# Patient Record
Sex: FEMALE | Race: WHITE | NOT HISPANIC OR LATINO | URBAN - METROPOLITAN AREA
[De-identification: names, ages, dates, MRNs, and addresses within clinical notes are randomized per-mention and may not be internally consistent; named-entity substitution may affect disease eponyms.]

---

## 2024-07-12 ENCOUNTER — APPOINTMENT (OUTPATIENT)
Dept: OBSTETRICS AND GYNECOLOGY | Facility: CLINIC | Age: 59
End: 2024-07-12
Payer: MEDICARE

## 2024-07-12 ENCOUNTER — HOSPITAL ENCOUNTER (OUTPATIENT)
Dept: RADIOLOGY | Facility: CLINIC | Age: 59
Discharge: HOME | End: 2024-07-12
Payer: MEDICARE

## 2024-07-12 ENCOUNTER — TRANSCRIBE ORDERS (OUTPATIENT)
Dept: OBSTETRICS AND GYNECOLOGY | Facility: CLINIC | Age: 59
End: 2024-07-12

## 2024-07-12 VITALS
HEIGHT: 65 IN | SYSTOLIC BLOOD PRESSURE: 124 MMHG | DIASTOLIC BLOOD PRESSURE: 80 MMHG | BODY MASS INDEX: 20.83 KG/M2 | WEIGHT: 125 LBS

## 2024-07-12 DIAGNOSIS — N90.4 VULVAR DYSTROPHY: Primary | ICD-10-CM

## 2024-07-12 DIAGNOSIS — Z12.4 ENCOUNTER FOR SCREENING FOR CERVICAL CANCER: ICD-10-CM

## 2024-07-12 DIAGNOSIS — R10.2 PELVIC PAIN: Primary | ICD-10-CM

## 2024-07-12 DIAGNOSIS — N90.4 VULVAR DYSTROPHY: ICD-10-CM

## 2024-07-12 PROCEDURE — 99204 OFFICE O/P NEW MOD 45 MIN: CPT | Performed by: OBSTETRICS & GYNECOLOGY

## 2024-07-12 PROCEDURE — 87624 HPV HI-RISK TYP POOLED RSLT: CPT

## 2024-07-12 PROCEDURE — 76830 TRANSVAGINAL US NON-OB: CPT | Performed by: OBSTETRICS & GYNECOLOGY

## 2024-07-12 RX ORDER — LIDOCAINE AND PRILOCAINE 25; 25 MG/G; MG/G
CREAM TOPICAL
Qty: 30 G | Refills: 0 | Status: SHIPPED | OUTPATIENT
Start: 2024-07-12

## 2024-07-12 ASSESSMENT — PAIN SCALES - GENERAL: PAINLEVEL: 9

## 2024-07-12 NOTE — PROGRESS NOTES
Subjective   Patient ID: Nia Ontiveros is a 58 y.o. female who presents for Vaginal bumps (PT is here for vaginal bumps she first noticed about 1 month ago.  PT said they have now spread and her pain is at a 9.  PT was last seen 20+ years ago.  PT declined chaperone).  HPI  Patient reports that she has had chronic vulvar irritation which she chalked up to menopausal changes.  She has a history of exposure to HPV last Pap smear 20 years ago.  She denies dysuria or vaginal bleeding.  Review of Systems  10 systems have been reviewed and are negative and noncontributory to the patient current ailments.    Objective   Physical Exam  Vital signs reviewed    CONSTITUTIONAL- well nourished, well developed, looks like stated age.  She is sitting comfortably on the exam table in no apparent distress  SKIN- normal skin color and pigmentation no visible lesions  EYES- normal external exam  THYROID- symmetrical ,normal size and normal consistency  HEART- RRR without murmur, S3 or S4.  LUNGS- breathing comfortably, no dyspnea  EXTREMITIES- no deformities.  Edema, discoloration, or pain in BLE  NEUROLOGICAL- A/Ox3, conversational   PSYCHIATRIC- alert, pleasant and cordial, age-appropriate, not anxious, or depressed appearing  BREASTS-normal appearance, no skin changes or nipple discharge.  Palpation of the breast and axillae: No palpable mass and no axillary lymphadenopathy  ABDOMEN- soft, non distended, bowel sound normal pitch and intensity,no palpable abnormal masses  GENITOURINARY- External genitalia examination of the vulva there are areas of hyperpigmentation and hypopigmentation scattered throughout the vulva concentrated on the labia minora and coursing toward the clitoral perkins.  Hyperplastic changes with maculopapular findings or cyst present consistent with vulvar dystrophy                                 - Uterus: AV/AF NSSC, mobile and nontender                                -The adnexal areas are free of  tenderness or mass                                -There are no cervical lesions; there is no cervical motion tenderness                                -Vagina without lesions, mucosa pink and well-hydrated, discharge is physiologic.                                   Inspection of Perianal Area: Normal    Assessment/Plan   Diagnoses and all orders for this visit:  Vulvar dystrophy  -We will treat the patient's pain symptoms with topical Emla cream.  Patient will return to the office for punch biopsies for histologic evaluation.  -Pap smear obtained.         Wiliam Cobb DO 07/12/24 9:06 AM

## 2024-07-18 ENCOUNTER — APPOINTMENT (OUTPATIENT)
Dept: OBSTETRICS AND GYNECOLOGY | Facility: CLINIC | Age: 59
End: 2024-07-18
Payer: MEDICARE

## 2024-07-18 VITALS
HEIGHT: 65 IN | BODY MASS INDEX: 20.83 KG/M2 | WEIGHT: 125 LBS | DIASTOLIC BLOOD PRESSURE: 64 MMHG | SYSTOLIC BLOOD PRESSURE: 110 MMHG

## 2024-07-18 DIAGNOSIS — N90.4 VULVAR DYSTROPHY: Primary | ICD-10-CM

## 2024-07-18 PROCEDURE — 3008F BODY MASS INDEX DOCD: CPT | Performed by: OBSTETRICS & GYNECOLOGY

## 2024-07-18 PROCEDURE — 4004F PT TOBACCO SCREEN RCVD TLK: CPT | Performed by: OBSTETRICS & GYNECOLOGY

## 2024-07-18 PROCEDURE — 56606 BIOPSY OF VULVA/PERINEUM: CPT | Performed by: OBSTETRICS & GYNECOLOGY

## 2024-07-18 PROCEDURE — 56605 BIOPSY OF VULVA/PERINEUM: CPT | Performed by: OBSTETRICS & GYNECOLOGY

## 2024-07-18 PROCEDURE — 88305 TISSUE EXAM BY PATHOLOGIST: CPT

## 2024-07-18 PROCEDURE — 88342 IMHCHEM/IMCYTCHM 1ST ANTB: CPT

## 2024-07-18 RX ORDER — LIDOCAINE AND PRILOCAINE 25; 25 MG/G; MG/G
CREAM TOPICAL
Qty: 30 G | Refills: 0 | Status: SHIPPED | OUTPATIENT
Start: 2024-07-18

## 2024-07-18 ASSESSMENT — PAIN SCALES - GENERAL: PAINLEVEL: 5

## 2024-07-18 NOTE — PROGRESS NOTES
Subjective   Patient ID: Nia Ontiveros is a 58 y.o. female who presents for Procedure (Vulvar Biopsy).  HPI  Patient was found to have multiple exophytic and multicolored hypertrophic lesions of the vulva she presents today to undergo biopsy for delineation of treatment  Review of Systems  Noncontributory  Objective   Physical Exam  Several punch biopsies were taken of the vulvar tissue in question.  The area was cleansed with Betadine and infiltrated with 1% lidocaine.  Multiple biopsies were taken and sent for histologic evaluation  1.  Right labia minora  2.  Fourchette  3.  Left interlabial sulcus    Assessment/Plan   Diagnoses and all orders for this visit:  Vulvar dystrophy  -Awaiting results of today's biopsies to determine treatment options.  Patient left the office in clinically sound condition with all her questions answered         Wiliam Cobb DO 07/18/24 12:20 PM

## 2024-07-22 ENCOUNTER — TELEPHONE (OUTPATIENT)
Dept: OBSTETRICS AND GYNECOLOGY | Facility: CLINIC | Age: 59
End: 2024-07-22
Payer: MEDICARE

## 2024-07-22 LAB
CYTOLOGY CMNT CVX/VAG CYTO-IMP: NORMAL
HPV HR 12 DNA GENITAL QL NAA+PROBE: NEGATIVE
HPV HR GENOTYPES PNL CVX NAA+PROBE: POSITIVE
HPV16 DNA SPEC QL NAA+PROBE: POSITIVE
HPV18 DNA SPEC QL NAA+PROBE: NEGATIVE
LAB AP HPV GENOTYPE QUESTION: YES
LAB AP HPV HR: NORMAL
LABORATORY COMMENT REPORT: NORMAL
MENSTRUAL HX REPORTED: NORMAL
PATH REPORT.TOTAL CANCER: NORMAL
RESIDENT REVIEW: NORMAL

## 2024-07-22 NOTE — TELEPHONE ENCOUNTER
Patient calling about her biopsy results that came back abnormal. She would like to know the next steps.    English Yang

## 2024-07-25 ENCOUNTER — TELEPHONE (OUTPATIENT)
Dept: OBSTETRICS AND GYNECOLOGY | Facility: CLINIC | Age: 59
End: 2024-07-25
Payer: MEDICARE

## 2024-07-25 DIAGNOSIS — N90.4 VULVAR DYSTROPHY: ICD-10-CM

## 2024-07-25 NOTE — TELEPHONE ENCOUNTER
Patient needs a refill on the Lidocaine cream   Prescription needs to have it for 1 tube a week  Insurance does not cover one every 30 days  Send to pharmacy on file

## 2024-07-26 DIAGNOSIS — N90.4 VULVAR DYSTROPHY: ICD-10-CM

## 2024-07-26 LAB
LAB AP ASR DISCLAIMER: NORMAL
LABORATORY COMMENT REPORT: NORMAL
PATH REPORT.COMMENTS IMP SPEC: NORMAL
PATH REPORT.FINAL DX SPEC: NORMAL
PATH REPORT.GROSS SPEC: NORMAL
PATH REPORT.RELEVANT HX SPEC: NORMAL
PATH REPORT.TOTAL CANCER: NORMAL

## 2024-07-26 RX ORDER — LIDOCAINE AND PRILOCAINE 25; 25 MG/G; MG/G
CREAM TOPICAL
Qty: 30 G | Refills: 1 | Status: SHIPPED | OUTPATIENT
Start: 2024-07-26

## 2024-07-26 NOTE — TELEPHONE ENCOUNTER
Patient needs refill, 30g is only lasting her a week, please send in before the weekend as she is in discomfort.

## 2024-07-29 ENCOUNTER — TELEPHONE (OUTPATIENT)
Dept: GYNECOLOGIC ONCOLOGY | Facility: HOSPITAL | Age: 59
End: 2024-07-29
Payer: MEDICARE

## 2024-07-29 NOTE — TELEPHONE ENCOUNTER
Phoned patient in follow up to [facility] message reporting right sided labial pain ( outside of previous biopsied area), left lower extremity edema that started 2 days ago.    Patient is scheduled to see Dr. Quiles as a new patient on 8/29/24.   Patient asking if sooner appointment is available.  Recent vulvar biopsies done by gyn showed HEAVEN 3.   Patient states right labial pain/discomfort is outside of the biopsy site.  Applying Lidocaine cream and taking ibuprofen 600mg every 4-6 hours for pain.   Denies labial/vulvar drainage, odor.  Denies fever.    LLE edema is new and accompanied with left knee pain.    Results and patient message routed to Dr. Quiles and Asmita Piña CNP.  Phoned patient to notify that recommendation is to keep appointment as scheduled on 8/29/24 and to proceed to ER for evaluation of LLE edema/pain.    Patient verbalized her understanding of information given.

## 2024-08-02 ENCOUNTER — APPOINTMENT (OUTPATIENT)
Dept: OBSTETRICS AND GYNECOLOGY | Facility: CLINIC | Age: 59
End: 2024-08-02
Payer: MEDICARE

## 2024-08-29 ENCOUNTER — OFFICE VISIT (OUTPATIENT)
Dept: GYNECOLOGIC ONCOLOGY | Facility: CLINIC | Age: 59
End: 2024-08-29
Payer: MEDICARE

## 2024-08-29 ENCOUNTER — PREP FOR PROCEDURE (OUTPATIENT)
Dept: OPERATING ROOM | Facility: HOSPITAL | Age: 59
End: 2024-08-29

## 2024-08-29 VITALS
BODY MASS INDEX: 20.91 KG/M2 | RESPIRATION RATE: 18 BRPM | TEMPERATURE: 98.8 F | SYSTOLIC BLOOD PRESSURE: 172 MMHG | DIASTOLIC BLOOD PRESSURE: 93 MMHG | HEART RATE: 81 BPM | WEIGHT: 125.66 LBS | OXYGEN SATURATION: 97 %

## 2024-08-29 DIAGNOSIS — B97.7 HPV IN FEMALE: ICD-10-CM

## 2024-08-29 DIAGNOSIS — O28.2 ASCUS (ATYPICAL SQUAMOUS CELLS OF UNDETERMINED SIGNIFICANCE) ON GYNECOLOGIC PAPANICOLAOU SMEAR COMPLICATING PREGNANCY, ANTEPARTUM: ICD-10-CM

## 2024-08-29 DIAGNOSIS — N90.4 VULVAR DYSTROPHY: ICD-10-CM

## 2024-08-29 DIAGNOSIS — R87.619 ASCUS (ATYPICAL SQUAMOUS CELLS OF UNDETERMINED SIGNIFICANCE) ON GYNECOLOGIC PAPANICOLAOU SMEAR COMPLICATING PREGNANCY, ANTEPARTUM: ICD-10-CM

## 2024-08-29 DIAGNOSIS — R10.2 VULVAR PAIN: ICD-10-CM

## 2024-08-29 DIAGNOSIS — D07.1 VIN III (VULVAR INTRAEPITHELIAL NEOPLASIA III): Primary | ICD-10-CM

## 2024-08-29 PROCEDURE — 99215 OFFICE O/P EST HI 40 MIN: CPT | Performed by: STUDENT IN AN ORGANIZED HEALTH CARE EDUCATION/TRAINING PROGRAM

## 2024-08-29 RX ORDER — CLOBETASOL PROPIONATE 0.5 MG/G
CREAM TOPICAL 2 TIMES DAILY
Qty: 45 G | Refills: 2 | Status: SHIPPED | OUTPATIENT
Start: 2024-08-29

## 2024-08-29 RX ORDER — CLOBETASOL PROPIONATE 0.5 MG/G
CREAM TOPICAL 2 TIMES DAILY
COMMUNITY
End: 2024-08-29 | Stop reason: SDUPTHER

## 2024-08-29 RX ORDER — SODIUM CHLORIDE, SODIUM LACTATE, POTASSIUM CHLORIDE, CALCIUM CHLORIDE 600; 310; 30; 20 MG/100ML; MG/100ML; MG/100ML; MG/100ML
20 INJECTION, SOLUTION INTRAVENOUS CONTINUOUS
Status: CANCELLED | OUTPATIENT
Start: 2024-08-29

## 2024-08-29 RX ORDER — LIDOCAINE AND PRILOCAINE 25; 25 MG/G; MG/G
CREAM TOPICAL
Qty: 30 G | Refills: 1 | Status: SHIPPED | OUTPATIENT
Start: 2024-08-29

## 2024-08-29 RX ORDER — CELECOXIB 200 MG/1
400 CAPSULE ORAL ONCE
Status: CANCELLED | OUTPATIENT
Start: 2024-08-29 | End: 2024-08-29

## 2024-08-29 ASSESSMENT — ENCOUNTER SYMPTOMS
NAUSEA: 0
DIARRHEA: 0
ENDOCRINE NEGATIVE: 1
COUGH: 1
EYES NEGATIVE: 1
OCCASIONAL FEELINGS OF UNSTEADINESS: 0
FEVER: 0
ABDOMINAL PAIN: 0
DEPRESSION: 0
LOSS OF SENSATION IN FEET: 0
VOMITING: 0
CARDIOVASCULAR NEGATIVE: 1
BLOOD IN STOOL: 0
HEMOPTYSIS: 0
WHEEZING: 0
NEUROLOGICAL NEGATIVE: 1
RECTAL PAIN: 0
FATIGUE: 1
CONSTIPATION: 0
ABDOMINAL DISTENTION: 1
CHILLS: 0
SHORTNESS OF BREATH: 1
APPETITE CHANGE: 0
CHEST TIGHTNESS: 0
DIAPHORESIS: 0
MUSCULOSKELETAL NEGATIVE: 1

## 2024-08-29 ASSESSMENT — PAIN SCALES - GENERAL: PAINLEVEL: 10-WORST PAIN EVER

## 2024-08-29 NOTE — H&P (VIEW-ONLY)
"Patient ID: Nia Ontiveros is a 58 y.o. female.  Referring Physician: No referring provider defined for this encounter.  Primary Care Provider: No Assigned PCP Generic Provider, MD      Subjective    HPI: Nia Ontiveros is a 58yF presenting for follow-up of HEAVEN III. She reports severe vulvar pruritis and pain since  that she describes as a burning, stabbing 10/10 pain that worsens when sitting. She has tried lidocaine and steroid cream that have slightly relieved the itching and pain. She previously took 600 mg advil daily that she stopped due to GI upset. She endorses other symptoms such as cough with phlegm, right rib pain, abdominal bloating, and LLE edema.     They deny fever, chills, constipation, diarrhea, vaginal bleeding, abdominal pain, nausea, vomiting, or any other symptoms other than those listed in the interval history.    PMH:  - Asthma controlled on albuterol inhalers PRN  - Vertigo     PSH:  - Breast biopsy, benign mass    OBHx:  The patient is a . Menarche was at 18. She underwent menopause at 36. She did not use HRT.    Social:  She endorses drinking 4-5 beers daily, smoking history of 1-1.5 packs for 30 years. No recreational drug use. The patient lives at home alone with her 2 dogs. 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     FamHx:  Great great grandmother with \"female\" cancer, unsure of the type of cancer. Grandfather with lung cancer. Other family history of T1D, dementia.     Their history is otherwise negative for a history of breast, ovarian, uterine, colon, pancreatic, and GI cancer.     Screening:  Cervical cancer: Pap smear 26 with ASCUS, positive HPV 16   Mammogram:  None  Colonoscopy: None      Review of Systems   Constitutional:  Positive for fatigue. Negative for appetite change, chills, diaphoresis and fever.   HENT:  Negative.     Eyes: Negative.    Respiratory:  Positive for cough and shortness of breath. Negative for chest " tightness, hemoptysis and wheezing.    Cardiovascular: Negative.    Gastrointestinal:  Positive for abdominal distention. Negative for abdominal pain, blood in stool, constipation, diarrhea, nausea, rectal pain and vomiting.   Endocrine: Negative.    Genitourinary: Negative.     Musculoskeletal: Negative.    Skin: Negative.    Neurological: Negative.         Objective   BSA: 1.62 meters squared  BP (!) 172/93   Pulse 81   Temp 37.1 °C (98.8 °F)   Resp 18   Wt 57 kg (125 lb 10.6 oz)   SpO2 97%   BMI 20.91 kg/m²      No family history on file.    Nia Ontiveros  reports that she has been smoking cigarettes. She has been exposed to tobacco smoke. She has never used smokeless tobacco.  She  reports current alcohol use.  She  reports no history of drug use.    Physical Exam  Constitutional:       Appearance: Normal appearance.   Cardiovascular:      Rate and Rhythm: Normal rate and regular rhythm.      Heart sounds: Normal heart sounds.   Pulmonary:      Breath sounds: Normal breath sounds.   Abdominal:      Palpations: Abdomen is soft.   Genitourinary:     Labia:         Right: No lesion.         Left: Tenderness and lesion present. No rash or injury.           Comments: 3.5cm hypopigmented warty lesion involving the right labia majora and clitoral perkins, extending from 9-1 o'clock. Two small (<0.5cm) hypopigmented lesions of the labia minora around 5 and 7 o'clock. Some areas of hyperpigmentation on the bilateral labia.   Musculoskeletal:         General: No swelling or tenderness.   Skin:     General: Skin is warm.   Neurological:      General: No focal deficit present.      Mental Status: She is oriented to person, place, and time.       Oncology History    No history exists.         Assessment/Plan      #HEAVEN III (vulvar intraepithelial neoplasia III)  #Vulvar pruritus and pain  # HEAVEN  - We discussed her pathology  - We discussed the pathophysiology of vulvar dyplasia, its relationship to cancer and frequent  association with HPV   - We discussed treatment options including medical and surgical options as well as my recommendation for a wide local excision as both a diagnostic and therapeutic modality  - Risks of surgery, anticipated hospital stay, and recovery were discussed  - She will not need PAT  - Lidocaine and clobetasol cream ordered for symptomatic control until surgery date  - She is a candidate for same day DC     #Cervical dysplasia  - We discussed the pathophysiology of cervical dyplasia, its relationship to cancer and frequent association with HPV   - Hx of HPV exposure, pap smear 7/2024 with ASCUS and HPV 16 positive  - Discussed plan for colposcopy intraoperatively       Patient seen and discussed with Dr. Kb Aviles, M3      I, or a resident under my supervision, was present with the medical student who participated in the documentation of this note.  I have personally seen and examined the patient and performed the medical decision-making components. I have reviewed the medical student documentation and/or resident documentation and verified the findings in the note as written with additions or exceptions as stated in the body of the note.    Dilcia Quiles MD

## 2024-08-29 NOTE — PROGRESS NOTES
"Patient ID: Nia Ontiveros is a 58 y.o. female.  Referring Physician: No referring provider defined for this encounter.  Primary Care Provider: No Assigned PCP Generic Provider, MD      Subjective    HPI: Nia Ontiveros is a 58yF presenting for follow-up of HEAVEN III. She reports severe vulvar pruritis and pain since  that she describes as a burning, stabbing 10/10 pain that worsens when sitting. She has tried lidocaine and steroid cream that have slightly relieved the itching and pain. She previously took 600 mg advil daily that she stopped due to GI upset. She endorses other symptoms such as cough with phlegm, right rib pain, abdominal bloating, and LLE edema.     They deny fever, chills, constipation, diarrhea, vaginal bleeding, abdominal pain, nausea, vomiting, or any other symptoms other than those listed in the interval history.    PMH:  - Asthma controlled on albuterol inhalers PRN  - Vertigo     PSH:  - Breast biopsy, benign mass    OBHx:  The patient is a . Menarche was at 18. She underwent menopause at 36. She did not use HRT.    Social:  She endorses drinking 4-5 beers daily, smoking history of 1-1.5 packs for 30 years. No recreational drug use. The patient lives at home alone with her 2 dogs. 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     FamHx:  Great great grandmother with \"female\" cancer, unsure of the type of cancer. Grandfather with lung cancer. Other family history of T1D, dementia.     Their history is otherwise negative for a history of breast, ovarian, uterine, colon, pancreatic, and GI cancer.     Screening:  Cervical cancer: Pap smear 26 with ASCUS, positive HPV 16   Mammogram:  None  Colonoscopy: None      Review of Systems   Constitutional:  Positive for fatigue. Negative for appetite change, chills, diaphoresis and fever.   HENT:  Negative.     Eyes: Negative.    Respiratory:  Positive for cough and shortness of breath. Negative for chest " tightness, hemoptysis and wheezing.    Cardiovascular: Negative.    Gastrointestinal:  Positive for abdominal distention. Negative for abdominal pain, blood in stool, constipation, diarrhea, nausea, rectal pain and vomiting.   Endocrine: Negative.    Genitourinary: Negative.     Musculoskeletal: Negative.    Skin: Negative.    Neurological: Negative.         Objective   BSA: 1.62 meters squared  BP (!) 172/93   Pulse 81   Temp 37.1 °C (98.8 °F)   Resp 18   Wt 57 kg (125 lb 10.6 oz)   SpO2 97%   BMI 20.91 kg/m²      No family history on file.    Nia Ontiveros  reports that she has been smoking cigarettes. She has been exposed to tobacco smoke. She has never used smokeless tobacco.  She  reports current alcohol use.  She  reports no history of drug use.    Physical Exam  Constitutional:       Appearance: Normal appearance.   Cardiovascular:      Rate and Rhythm: Normal rate and regular rhythm.      Heart sounds: Normal heart sounds.   Pulmonary:      Breath sounds: Normal breath sounds.   Abdominal:      Palpations: Abdomen is soft.   Genitourinary:     Labia:         Right: No lesion.         Left: Tenderness and lesion present. No rash or injury.           Comments: 3.5cm hypopigmented warty lesion involving the right labia majora and clitoral perkins, extending from 9-1 o'clock. Two small (<0.5cm) hypopigmented lesions of the labia minora around 5 and 7 o'clock. Some areas of hyperpigmentation on the bilateral labia.   Musculoskeletal:         General: No swelling or tenderness.   Skin:     General: Skin is warm.   Neurological:      General: No focal deficit present.      Mental Status: She is oriented to person, place, and time.       Oncology History    No history exists.         Assessment/Plan      #HEAVEN III (vulvar intraepithelial neoplasia III)  #Vulvar pruritus and pain  # HEAVEN  - We discussed her pathology  - We discussed the pathophysiology of vulvar dyplasia, its relationship to cancer and frequent  association with HPV   - We discussed treatment options including medical and surgical options as well as my recommendation for a wide local excision as both a diagnostic and therapeutic modality  - Risks of surgery, anticipated hospital stay, and recovery were discussed  - She will not need PAT  - Lidocaine and clobetasol cream ordered for symptomatic control until surgery date  - She is a candidate for same day DC     #Cervical dysplasia  - We discussed the pathophysiology of cervical dyplasia, its relationship to cancer and frequent association with HPV   - Hx of HPV exposure, pap smear 7/2024 with ASCUS and HPV 16 positive  - Discussed plan for colposcopy intraoperatively       Patient seen and discussed with Dr. Kb Aviles, M3      I, or a resident under my supervision, was present with the medical student who participated in the documentation of this note.  I have personally seen and examined the patient and performed the medical decision-making components. I have reviewed the medical student documentation and/or resident documentation and verified the findings in the note as written with additions or exceptions as stated in the body of the note.    Dilcia Quiles MD

## 2024-08-30 ENCOUNTER — LAB (OUTPATIENT)
Dept: LAB | Facility: LAB | Age: 59
End: 2024-08-30
Payer: MEDICARE

## 2024-08-30 DIAGNOSIS — Z41.9 SURGERY, ELECTIVE: ICD-10-CM

## 2024-08-30 DIAGNOSIS — D07.1 VIN III (VULVAR INTRAEPITHELIAL NEOPLASIA III): ICD-10-CM

## 2024-08-30 LAB
ANION GAP SERPL CALC-SCNC: 14 MMOL/L (ref 10–20)
BUN SERPL-MCNC: 6 MG/DL (ref 6–23)
CALCIUM SERPL-MCNC: 9.4 MG/DL (ref 8.6–10.6)
CHLORIDE SERPL-SCNC: 98 MMOL/L (ref 98–107)
CO2 SERPL-SCNC: 28 MMOL/L (ref 21–32)
CREAT SERPL-MCNC: 0.75 MG/DL (ref 0.5–1.05)
EGFRCR SERPLBLD CKD-EPI 2021: >90 ML/MIN/1.73M*2
ERYTHROCYTE [DISTWIDTH] IN BLOOD BY AUTOMATED COUNT: 12.9 % (ref 11.5–14.5)
GLUCOSE SERPL-MCNC: 116 MG/DL (ref 74–99)
HCT VFR BLD AUTO: 43.3 % (ref 36–46)
HGB BLD-MCNC: 14.2 G/DL (ref 12–16)
MCH RBC QN AUTO: 33 PG (ref 26–34)
MCHC RBC AUTO-ENTMCNC: 32.8 G/DL (ref 32–36)
MCV RBC AUTO: 101 FL (ref 80–100)
NRBC BLD-RTO: 0 /100 WBCS (ref 0–0)
PLATELET # BLD AUTO: 236 X10*3/UL (ref 150–450)
POTASSIUM SERPL-SCNC: 4.1 MMOL/L (ref 3.5–5.3)
RBC # BLD AUTO: 4.3 X10*6/UL (ref 4–5.2)
SODIUM SERPL-SCNC: 136 MMOL/L (ref 136–145)
WBC # BLD AUTO: 7.1 X10*3/UL (ref 4.4–11.3)

## 2024-08-30 PROCEDURE — 36415 COLL VENOUS BLD VENIPUNCTURE: CPT

## 2024-08-30 PROCEDURE — 80048 BASIC METABOLIC PNL TOTAL CA: CPT

## 2024-08-30 PROCEDURE — 85027 COMPLETE CBC AUTOMATED: CPT

## 2024-08-30 RX ORDER — ALBUTEROL SULFATE 90 UG/1
2 INHALANT RESPIRATORY (INHALATION) EVERY 6 HOURS PRN
COMMUNITY

## 2024-08-30 NOTE — PREPROCEDURE INSTRUCTIONS
Pre-Op Instructions & Checklist  Your surgery has been scheduled at [facility] at [address] [place], [address], [address], in the [facility]. Parking is to the left of the main entrance.  You will be contacted about the time of your surgery the day before your surgery (if your surgery is on a Monday, you will be called the Friday before surgery). If you are unable to answer the phone, a detailed voicemail message will be left. Make sure that your voicemail box is not full so a message can be left. If you have not received a call by 3:00 pm you may call [phone] between the hours of 3:00 and 4:00 pm. Please be available by phone the night before/day of surgery in case there is a change in the schedule which may require you to arrive earlier/later.  14 DAYS BEFORE SURGERY STOP TAKING WEIGHT LOSS MEDICATIONS     7 DAYS BEFORE SURGERY STOP THESE MEDICATIONS:  Multiple Vitamins containing Vitamin E  Herbal supplements, Fish Oil, garlic pills, turmeric, CoQ enzyme  Stop taking aspirin, and aspirin-containing products as well as NSAID's such as Advil, Motrin, Aleve, Ibuprofen. Tylenol is okay to take for pain relief.   If you are currently taking Coumadin/Warfarin, we will have to coordinate that with your PCP &/or the Anticoagulation Clinic.    THE DAY BEFORE SURGERY:  Do not eat any food after midnight the night before surgery.   You are permitted to have no more than 4 ounces of clear liquids such as water, apple juice, plain tea or coffee (no milk or creamer), clear electrolyte-replenishing drinks such as Pedialyte, Gatorade, or Powerade (not yogurt or pulp-containing smoothies or juices such as orange juice) up to 3 hours before your arrival time.    DAY OF SURGERY, TAKE THESE MEDICATIONS with a small sip of water (if it is not listed, do not take it):  There are no medications for you to take on the morning of surgery    ON THE MORNING OF SURGERY:  *Shower either the night  before your surgery or the morning of your surgery  *Do not use moisturizers, creams, lotions or perfume, or make-up.  *Wear comfortable, loose fitting clothing.   *All jewelry and valuables should be left at home.  *Prosthetic devices such as contact lenses, hearing aids, dentures, eyelash extensions, hairpins and body piercing must be removed before surgery. Bring containers for eyeglasses/contacts, dentures, or hearing aids with you.  Diabetics: Please check fasting blood sugars upon waking up.  If fasting blood sugars are<80ml/dl, please drink 3 ounces of apple juice no later than 2 hours prior to surgery.    BRING WITH YOU:  *Photo ID and insurance card  *Current list of medicines and allergies  *Pacemaker/Defibrillator/Heart stent cards  *Copy of your complete Advanced Directive/DHPOA-if applicable    SMOKING:  *Quitting smoking can make a huge difference to your health and recovery from surgery.    *If you need help with quitting, call [phone]-QUIT-NOW.    Alcohol:  *No alcoholic beverages for 48 hours before surgery.    AFTER OUTPATIENT SURGERY:  *A responsible adult MUST accompany you at the time of discharge and stay with you for 24 hours after your surgery.  *You may NOT drive yourself home after surgery.  *You may use a taxi or ride sharing service ([employer]) to return home ONLY if you are accompanied by a friend or family member as of today, stop takings.  *Instructions for resuming your medications will be provided by your surgeon.    CONTACT SURGEON'S OFFICE IF YOU DEVELOP:  * Fever =/> 100.4 F   * New respiratory symptoms (e.g. cough, shortness of breath, respiratory distress, sore throat)  * Recent loss of taste or smell  *Flu like symptoms such as headache, fatigue or gastrointestinal symptoms  * If you develop any open sores, shingles, burning or painful urination   AND/OR:  * You no longer wish to have the surgery.  * Any other personal circumstances change that may lead to the need to cancel or  defer this surgery.  *You were admitted to any hospital within one week of your planned procedure.      If you have any questions regarding these preoperative instructions you may call [phone]. If you have questions regarding you surgical procedure, or post-operative care/recovery please call your surgeon's office.

## 2024-09-03 ENCOUNTER — ANESTHESIA EVENT (OUTPATIENT)
Dept: OPERATING ROOM | Facility: CLINIC | Age: 59
End: 2024-09-03
Payer: MEDICARE

## 2024-09-03 NOTE — DISCHARGE INSTRUCTIONS
Vulvar Care after vulvar surgery  1. AVOID TENSION ON THE SUTURES (stretching and pulling) as much as possible until your postoperative visit in the clinic.  This will mean limiting your activities during the healing process.  Get in and out of bed with care.  Avoid sitting as much as is feasible.  2. Perform sitz baths or rinse the area with a hand held shower device two to three times per day using lukewarm water.  Avoid soap and do not rub.  3. IMPORTANT - KEEP THE INCISIONS AS DRY AS POSSIBLE in between sitz baths.  After each sitz bath, blot the area with a towel (do not rub).  You may even use a warm (not hot) blow dryer to further dry the area.    4. Your vulvar/vaginal area will tend to be moist with a light drainage.  Excess moisture may increase risk for infection.  To reduce excess moisture buildup, keep a dry wash cloth between your legs.  Replace as necessary to keep dry. Take measures to keep the inguinal (groin) area dry as well.  5. Avoid constipation and straining with bowel movements.  You may take 100 mg of Colace twice daily as a stool softener (over the counter).  Stronger laxatives can be used as necessary.  Perform your sitz baths or shower rinses preferentially after bowel movements.  6. You can ice the area by applying an ice pack or frozen peas to the area. Do not ice for longer than 20 minutes  7.  If you have questions or experience fevers greater than 101 F degrees, foul-smelling drainage, excessive pain not relieved by prescribed medications, or have any questions after your discharge, call # [phone].  After hours your providers will be contacted via an answering service.

## 2024-09-04 ENCOUNTER — ANESTHESIA (OUTPATIENT)
Dept: OPERATING ROOM | Facility: CLINIC | Age: 59
End: 2024-09-04
Payer: MEDICARE

## 2024-09-04 ENCOUNTER — HOSPITAL ENCOUNTER (OUTPATIENT)
Facility: CLINIC | Age: 59
Setting detail: OUTPATIENT SURGERY
Discharge: HOME | End: 2024-09-04
Attending: STUDENT IN AN ORGANIZED HEALTH CARE EDUCATION/TRAINING PROGRAM | Admitting: STUDENT IN AN ORGANIZED HEALTH CARE EDUCATION/TRAINING PROGRAM
Payer: MEDICARE

## 2024-09-04 VITALS
WEIGHT: 123.46 LBS | HEIGHT: 66 IN | SYSTOLIC BLOOD PRESSURE: 144 MMHG | TEMPERATURE: 97.5 F | HEART RATE: 68 BPM | BODY MASS INDEX: 19.84 KG/M2 | RESPIRATION RATE: 18 BRPM | DIASTOLIC BLOOD PRESSURE: 79 MMHG | OXYGEN SATURATION: 100 %

## 2024-09-04 DIAGNOSIS — D07.1 VIN III (VULVAR INTRAEPITHELIAL NEOPLASIA III): ICD-10-CM

## 2024-09-04 DIAGNOSIS — O28.2 ASCUS (ATYPICAL SQUAMOUS CELLS OF UNDETERMINED SIGNIFICANCE) ON GYNECOLOGIC PAPANICOLAOU SMEAR COMPLICATING PREGNANCY, ANTEPARTUM: ICD-10-CM

## 2024-09-04 DIAGNOSIS — B97.7 HPV IN FEMALE: ICD-10-CM

## 2024-09-04 DIAGNOSIS — Z41.9 SURGERY, ELECTIVE: Primary | ICD-10-CM

## 2024-09-04 DIAGNOSIS — R87.619 ASCUS (ATYPICAL SQUAMOUS CELLS OF UNDETERMINED SIGNIFICANCE) ON GYNECOLOGIC PAPANICOLAOU SMEAR COMPLICATING PREGNANCY, ANTEPARTUM: ICD-10-CM

## 2024-09-04 PROCEDURE — 88305 TISSUE EXAM BY PATHOLOGIST: CPT | Performed by: PATHOLOGY

## 2024-09-04 PROCEDURE — 2720000007 HC OR 272 NO HCPCS: Performed by: STUDENT IN AN ORGANIZED HEALTH CARE EDUCATION/TRAINING PROGRAM

## 2024-09-04 PROCEDURE — 88305 TISSUE EXAM BY PATHOLOGIST: CPT | Mod: TC,SUR | Performed by: STUDENT IN AN ORGANIZED HEALTH CARE EDUCATION/TRAINING PROGRAM

## 2024-09-04 PROCEDURE — 3700000001 HC GENERAL ANESTHESIA TIME - INITIAL BASE CHARGE: Performed by: STUDENT IN AN ORGANIZED HEALTH CARE EDUCATION/TRAINING PROGRAM

## 2024-09-04 PROCEDURE — 2500000004 HC RX 250 GENERAL PHARMACY W/ HCPCS (ALT 636 FOR OP/ED): Performed by: NURSE ANESTHETIST, CERTIFIED REGISTERED

## 2024-09-04 PROCEDURE — 3700000002 HC GENERAL ANESTHESIA TIME - EACH INCREMENTAL 1 MINUTE: Performed by: STUDENT IN AN ORGANIZED HEALTH CARE EDUCATION/TRAINING PROGRAM

## 2024-09-04 PROCEDURE — 2500000005 HC RX 250 GENERAL PHARMACY W/O HCPCS: Performed by: STUDENT IN AN ORGANIZED HEALTH CARE EDUCATION/TRAINING PROGRAM

## 2024-09-04 PROCEDURE — 88309 TISSUE EXAM BY PATHOLOGIST: CPT | Performed by: PATHOLOGY

## 2024-09-04 PROCEDURE — 3600000003 HC OR TIME - INITIAL BASE CHARGE - PROCEDURE LEVEL THREE: Performed by: STUDENT IN AN ORGANIZED HEALTH CARE EDUCATION/TRAINING PROGRAM

## 2024-09-04 PROCEDURE — 7100000010 HC PHASE TWO TIME - EACH INCREMENTAL 1 MINUTE: Performed by: STUDENT IN AN ORGANIZED HEALTH CARE EDUCATION/TRAINING PROGRAM

## 2024-09-04 PROCEDURE — 2500000004 HC RX 250 GENERAL PHARMACY W/ HCPCS (ALT 636 FOR OP/ED): Performed by: STUDENT IN AN ORGANIZED HEALTH CARE EDUCATION/TRAINING PROGRAM

## 2024-09-04 PROCEDURE — 2500000001 HC RX 250 WO HCPCS SELF ADMINISTERED DRUGS (ALT 637 FOR MEDICARE OP): Performed by: STUDENT IN AN ORGANIZED HEALTH CARE EDUCATION/TRAINING PROGRAM

## 2024-09-04 PROCEDURE — 7100000009 HC PHASE TWO TIME - INITIAL BASE CHARGE: Performed by: STUDENT IN AN ORGANIZED HEALTH CARE EDUCATION/TRAINING PROGRAM

## 2024-09-04 PROCEDURE — 3600000008 HC OR TIME - EACH INCREMENTAL 1 MINUTE - PROCEDURE LEVEL THREE: Performed by: STUDENT IN AN ORGANIZED HEALTH CARE EDUCATION/TRAINING PROGRAM

## 2024-09-04 RX ORDER — CEFAZOLIN 1 G/1
INJECTION, POWDER, FOR SOLUTION INTRAVENOUS AS NEEDED
Status: DISCONTINUED | OUTPATIENT
Start: 2024-09-04 | End: 2024-09-04

## 2024-09-04 RX ORDER — LIDOCAINE IN NACL,ISO-OSMOT/PF 30 MG/3 ML
0.1 SYRINGE (ML) INJECTION ONCE
Status: DISCONTINUED | OUTPATIENT
Start: 2024-09-04 | End: 2024-09-04 | Stop reason: HOSPADM

## 2024-09-04 RX ORDER — FENTANYL CITRATE 50 UG/ML
INJECTION, SOLUTION INTRAMUSCULAR; INTRAVENOUS AS NEEDED
Status: DISCONTINUED | OUTPATIENT
Start: 2024-09-04 | End: 2024-09-04

## 2024-09-04 RX ORDER — IBUPROFEN 800 MG/1
800 TABLET ORAL 3 TIMES DAILY PRN
Qty: 60 TABLET | Refills: 0 | Status: SHIPPED | OUTPATIENT
Start: 2024-09-04 | End: 2024-11-03

## 2024-09-04 RX ORDER — SODIUM CHLORIDE 0.9 G/100ML
IRRIGANT IRRIGATION AS NEEDED
Status: DISCONTINUED | OUTPATIENT
Start: 2024-09-04 | End: 2024-09-04 | Stop reason: HOSPADM

## 2024-09-04 RX ORDER — ONDANSETRON HYDROCHLORIDE 2 MG/ML
4 INJECTION, SOLUTION INTRAVENOUS ONCE AS NEEDED
Status: DISCONTINUED | OUTPATIENT
Start: 2024-09-04 | End: 2024-09-04 | Stop reason: HOSPADM

## 2024-09-04 RX ORDER — BACITRACIN ZINC 500 UNIT/G
OINTMENT (GRAM) TOPICAL 2 TIMES DAILY
Qty: 14 G | Refills: 0 | Status: SHIPPED | OUTPATIENT
Start: 2024-09-04

## 2024-09-04 RX ORDER — TRAMADOL HYDROCHLORIDE 50 MG/1
50 TABLET ORAL EVERY 6 HOURS PRN
Qty: 15 TABLET | Refills: 0 | Status: CANCELLED | OUTPATIENT
Start: 2024-09-04 | End: 2024-09-11

## 2024-09-04 RX ORDER — PROPOFOL 10 MG/ML
INJECTION, EMULSION INTRAVENOUS CONTINUOUS PRN
Status: DISCONTINUED | OUTPATIENT
Start: 2024-09-04 | End: 2024-09-04

## 2024-09-04 RX ORDER — FENTANYL CITRATE 50 UG/ML
50 INJECTION, SOLUTION INTRAMUSCULAR; INTRAVENOUS EVERY 5 MIN PRN
Status: DISCONTINUED | OUTPATIENT
Start: 2024-09-04 | End: 2024-09-04 | Stop reason: HOSPADM

## 2024-09-04 RX ORDER — KETOROLAC TROMETHAMINE 30 MG/ML
INJECTION, SOLUTION INTRAMUSCULAR; INTRAVENOUS AS NEEDED
Status: DISCONTINUED | OUTPATIENT
Start: 2024-09-04 | End: 2024-09-04

## 2024-09-04 RX ORDER — SODIUM CHLORIDE, SODIUM LACTATE, POTASSIUM CHLORIDE, CALCIUM CHLORIDE 600; 310; 30; 20 MG/100ML; MG/100ML; MG/100ML; MG/100ML
20 INJECTION, SOLUTION INTRAVENOUS CONTINUOUS
Status: DISCONTINUED | OUTPATIENT
Start: 2024-09-04 | End: 2024-09-04 | Stop reason: HOSPADM

## 2024-09-04 RX ORDER — BACITRACIN ZINC 500 UNIT/G
OINTMENT IN PACKET (EA) TOPICAL AS NEEDED
Status: DISCONTINUED | OUTPATIENT
Start: 2024-09-04 | End: 2024-09-04 | Stop reason: HOSPADM

## 2024-09-04 RX ORDER — OXYCODONE HYDROCHLORIDE 5 MG/1
5 TABLET ORAL EVERY 6 HOURS PRN
Qty: 15 TABLET | Refills: 0 | Status: SHIPPED | OUTPATIENT
Start: 2024-09-04 | End: 2024-09-11

## 2024-09-04 RX ORDER — OXYCODONE HYDROCHLORIDE 5 MG/1
5 TABLET ORAL EVERY 4 HOURS PRN
Status: DISCONTINUED | OUTPATIENT
Start: 2024-09-04 | End: 2024-09-04 | Stop reason: HOSPADM

## 2024-09-04 RX ORDER — ACETAMINOPHEN 500 MG
1000 TABLET ORAL EVERY 6 HOURS PRN
Qty: 30 TABLET | Refills: 0 | Status: SHIPPED | OUTPATIENT
Start: 2024-09-04 | End: 2024-09-14

## 2024-09-04 RX ORDER — DOCUSATE SODIUM 100 MG/1
100 CAPSULE, LIQUID FILLED ORAL 2 TIMES DAILY
Qty: 60 CAPSULE | Refills: 0 | Status: SHIPPED | OUTPATIENT
Start: 2024-09-04

## 2024-09-04 RX ORDER — ACETAMINOPHEN 325 MG/1
650 TABLET ORAL EVERY 4 HOURS PRN
Status: DISCONTINUED | OUTPATIENT
Start: 2024-09-04 | End: 2024-09-04 | Stop reason: HOSPADM

## 2024-09-04 RX ORDER — LIDOCAINE HYDROCHLORIDE 10 MG/ML
INJECTION INFILTRATION; PERINEURAL AS NEEDED
Status: DISCONTINUED | OUTPATIENT
Start: 2024-09-04 | End: 2024-09-04 | Stop reason: HOSPADM

## 2024-09-04 RX ORDER — MIDAZOLAM HYDROCHLORIDE 1 MG/ML
INJECTION, SOLUTION INTRAMUSCULAR; INTRAVENOUS AS NEEDED
Status: DISCONTINUED | OUTPATIENT
Start: 2024-09-04 | End: 2024-09-04

## 2024-09-04 RX ORDER — SODIUM CHLORIDE, SODIUM LACTATE, POTASSIUM CHLORIDE, CALCIUM CHLORIDE 600; 310; 30; 20 MG/100ML; MG/100ML; MG/100ML; MG/100ML
100 INJECTION, SOLUTION INTRAVENOUS CONTINUOUS
Status: DISCONTINUED | OUTPATIENT
Start: 2024-09-04 | End: 2024-09-04 | Stop reason: HOSPADM

## 2024-09-04 RX ORDER — ONDANSETRON HYDROCHLORIDE 2 MG/ML
INJECTION, SOLUTION INTRAVENOUS AS NEEDED
Status: DISCONTINUED | OUTPATIENT
Start: 2024-09-04 | End: 2024-09-04

## 2024-09-04 RX ORDER — CELECOXIB 200 MG/1
400 CAPSULE ORAL ONCE
Status: DISCONTINUED | OUTPATIENT
Start: 2024-09-04 | End: 2024-09-04 | Stop reason: HOSPADM

## 2024-09-04 ASSESSMENT — COLUMBIA-SUICIDE SEVERITY RATING SCALE - C-SSRS
6. HAVE YOU EVER DONE ANYTHING, STARTED TO DO ANYTHING, OR PREPARED TO DO ANYTHING TO END YOUR LIFE?: NO
1. IN THE PAST MONTH, HAVE YOU WISHED YOU WERE DEAD OR WISHED YOU COULD GO TO SLEEP AND NOT WAKE UP?: NO
2. HAVE YOU ACTUALLY HAD ANY THOUGHTS OF KILLING YOURSELF?: NO

## 2024-09-04 ASSESSMENT — PAIN SCALES - GENERAL
PAINLEVEL_OUTOF10: 10 - WORST POSSIBLE PAIN
PAINLEVEL_OUTOF10: 0 - NO PAIN
PAINLEVEL_OUTOF10: 0 - NO PAIN

## 2024-09-04 ASSESSMENT — PAIN - FUNCTIONAL ASSESSMENT
PAIN_FUNCTIONAL_ASSESSMENT: 0-10

## 2024-09-04 NOTE — ANESTHESIA PREPROCEDURE EVALUATION
Patient: Nia Ontiveros    Procedure Information       Date/Time: 24 1315    Procedure: Wide local excision of the vulva    Location: [facility]  [facility]    Surgeons: Dilcia Quiles MD        There were no vitals filed for this visit.    Past Surgical History:   Procedure Laterality Date   • BREAST LUMPECTOMY  2014    Breast Surgery Lumpectomy   • CERVIX LESION DESTRUCTION     •  SECTION, CLASSIC  2014     Section   •  SECTION, LOW TRANSVERSE     • HERNIA REPAIR  2014    Hernia Repair   • TUBAL LIGATION  2014    Tubal Ligation     Past Medical History:   Diagnosis Date   • BPPV (benign paroxysmal positional vertigo)      No current facility-administered medications for this encounter.  Prior to Admission medications    Medication Sig Start Date End Date Taking? Authorizing Provider   albuterol (ProAir HFA) 90 mcg/actuation inhaler Inhale 2 puffs every 6 hours if needed for wheezing.   Yes Historical Provider, MD   clobetasol (Temovate) 0.05 % cream Apply topically 2 times a day. 24  Yes Dilcia Quiles MD   lidocaine-prilocaine (Emla) 2.5-2.5 % cream Apply to skin as needed 24  Yes Dilcia Quiles MD   clobetasol (Temovate) 0.05 % cream Apply topically 2 times a day.  24  Historical Provider, MD   lidocaine-prilocaine (Emla) 2.5-2.5 % cream APPLY TO SKIN AS NEEDED 24  Wiliam Cobb, DO     No Known Allergies  Social History     Tobacco Use   • Smoking status: Every Day     Types: Cigarettes     Passive exposure: Current   • Smokeless tobacco: Never   • Tobacco comments:     1ppd x 30yr   Substance Use Topics   • Alcohol use: Yes         Chemistry    Lab Results   Component Value Date/Time     2024 1032    K 4.1 2024 1032    CL 98 2024 1032    CO2 28 2024 1032    BUN 6 2024 1032    CREATININE 0.75 2024 1032    Lab Results   Component Value Date/Time     "CALCIUM 9.4 08/30/2024 1032          Lab Results   Component Value Date/Time    WBC 7.1 08/30/2024 1032    HGB 14.2 08/30/2024 1032    HCT 43.3 08/30/2024 1032     08/30/2024 1032     No results found for: \"PROTIME\", \"PTT\", \"INR\"  No results found for this or any previous visit (from the past 4464 hour(s)).    Relevant Problems   ID   (+) HPV in female       Clinical information reviewed:   Tobacco  Allergies  Meds   Med Hx  Surg Hx  OB Status  Fam Hx  Soc   Hx        NPO Detail:  No data recorded     Physical Exam    Airway  Mallampati: II  TM distance: >3 FB  Neck ROM: full     Cardiovascular    Dental - normal exam     Pulmonary    Abdominal        Anesthesia Plan    History of general anesthesia?: yes  History of complications of general anesthesia?: no    ASA 2     MAC     intravenous induction   Anesthetic plan and risks discussed with patient.  "

## 2024-09-04 NOTE — OP NOTE
Wide local excision of the vulva, COLPOSCOPY, CERVIX AND ADJACENT VAGINA, WITH LEEP CERVICAL CONE BIOPSY Operative Note     Date: 2024  OR Location: [facility] [place] [facility] OR    Name: Nia Ontiveros, : 1965, Age: 58 y.o., MRN: [id number], Sex: female    Diagnosis  Pre-op Diagnosis      * HEAVEN III (vulvar intraepithelial neoplasia III) [D07.1] Post-op Diagnosis     * HEAVEN III (vulvar intraepithelial neoplasia III) [D07.1]     Procedures  Wide local excision of the vulva  70534 - [place] VULVECTOMY SIMPLE COMPLETE    COLPOSCOPY, CERVIX AND ADJACENT VAGINA, WITH LEEP CERVICAL CONE BIOPSY  48953 - [place] COLPOSCOPY CERVIX VAG ELTRD CONIZATION CERVIX      Surgeons      * Dilcia Quiles - Primary    Resident/Fellow/Other Assistant:  Surgeons and Role:  * No surgeons found with a matching role *    Procedure Summary  Anesthesia: Monitor Anesthesia Care  ASA: II  Anesthesia Staff: Anesthesiologist: Jessica Gentile MD  CRNA: ISSAC Dinero-CRNA  Estimated Blood Loss: 100mL  Intra-op Medications:   Administrations occurring from 1315 to 1415 on 24:   Medication Name Total Dose   lidocaine (Xylocaine) 10 mg/mL (1 %) injection 30 mL   bacitracin ointment 1 Application   lactated Ringer's infusion Cannot be calculated              Anesthesia Record               Intraprocedure I/O Totals          Intake    Propofol Drip 0.00 mL    The total shown is the total volume documented since Anesthesia Start was filed.    lactated Ringer's infusion 900.00 mL    Total Intake 900 mL          Specimen:   ID Type Source Tests Collected by Time   1 : LEFT VAGINAL WALL BIOPSY Tissue VAGINA BIOPSY SURGICAL PATHOLOGY EXAM Dilcia Quiles MD 2024 1310   2 : CERVICAL BIOPSY AT 3 O'CLOCK Tissue VAGINA BIOPSY SURGICAL PATHOLOGY EXAM Dilcia Quiles MD 2024 1314   3 : ENDOCERVIX CURETTINGS Tissue ENDOCERVIX CURETTINGS SURGICAL PATHOLOGY EXAM Dilcia Quiles MD 2024 1316   4 : WIDE LOCAL EXCISION OF THE VULVA STITCH AT  12 O'CLOCK Tissue VULVA WIDE LOCAL EXCISION SURGICAL PATHOLOGY EXAM Dilcia Quiles MD 9/4/2024 1321   5 : clitoris lesion at 12 o'clock Tissue VAGINA EXCISION SURGICAL PATHOLOGY EXAM Dilcia Quiles MD 9/4/2024 1336        Staff:   Circulator: Edilma Gonsalves Person: Kristine         Drains and/or Catheters: * None in log *    Tourniquet Times:         Implants:     Findings: Cervix and vagina atrophic. Acetowhite changes noted on the right vaginal side wall adjacent to the cervix at 9 o'clock. Acetowhite changes vs atrophy on the cervix at 3 o'clock. SCJ visualized entirely. Warty changes/ hypo and hyperpigmentation of bilateral labia majora and clitoral perkins. Lesions most focused on the right labia minora near the clitoris. Extending to posterior fourchette bilaterally.     Indications: Nia Ontiveros is an 58 y.o. female who is having surgery for HEAVEN III (vulvar intraepithelial neoplasia III) [D07.1].     The patient was seen in the preoperative area. The risks, benefits, complications, treatment options, non-operative alternatives, expected recovery and outcomes were discussed with the patient. The possibilities of reaction to medication, pulmonary aspiration, injury to surrounding structures, bleeding, recurrent infection, the need for additional procedures, failure to diagnose a condition, and creating a complication requiring transfusion or operation were discussed with the patient. The patient concurred with the proposed plan, giving informed consent.  The site of surgery was properly noted/marked if necessary per policy. The patient has been actively warmed in preoperative area. Preoperative antibiotics have been ordered and given within 1 hours of incision. Venous thrombosis prophylaxis are not indicated.    Procedure Details:     The patient was taken to the operating room where she had sequential compression devices placed. A safety check confirming patient's name and MR number was performed.  General anesthesia was administered and found to be adequate. The patient was positioned in low lithotomy with use of Girma stirrups. The perineum and vagina were prepped and draped in normal sterile fashion.    Attention was first turned to the colposcopy. A speculum was placed. Acetic acid was applied to the cervix. Findings as noted above. Biopsies were taken of the right vaginal wall and cervix as noted above. ECC was taken.The speculum was removed.     Attention was then turned to the perineum. The perineal lesion was marked and infiltrated with local anesthetic until adequate blanching noted.    Vulvar lesion was marked and excised in an ellipse shape with surrounding 1 centimeter of grossly uninvolved circumferential margins using a scalpel. Electrocautery was used to divide the specimen from underlying subcutaneous tissue. Total defect measured 11x8 cm. This tracked bilaterally down the labia and the skin overlying the clitoral perkins. Hemostasis was achieved with the use of electrocautery. We then proceeded with closure of the perineal incision. Subcutaneous tissue was reapproximated using 2-0 Vicryl in an interrupted fashion. Skin was reapproximated with a 3-0 Vicryl in a running subcuticular fashion. A&D ointment was applied to the incisions at the end of the procedure. The patient was returned to the dorsal supine position.   All sponge, lap and needle counts were correct at the end of the procedure. Overall the patient tolerated procedure well and was taken to PACU in stable condition.    Complications:  None; patient tolerated the procedure well.    Disposition: PACU - hemodynamically stable.  Condition: stable         Additional Details: None    Attending Attestation: I was present and scrubbed for the entire procedure.    Dilcia Quiles  Phone Number: [phone]

## 2024-09-04 NOTE — ANESTHESIA POSTPROCEDURE EVALUATION
Patient: Nia Ontiveros    Procedure Summary       Date: 09/04/24 Room / Location: [facility] [place] [facility] [place] 05 / Virtual [facility] [place] [facility] [place]    Anesthesia Start: 1251 Anesthesia Stop: 1414    Procedures:       Wide local excision of the vulva      COLPOSCOPY, CERVIX AND ADJACENT VAGINA, WITH LEEP CERVICAL CONE BIOPSY Diagnosis:       HEAVEN III (vulvar intraepithelial neoplasia III)      (HEAVEN III (vulvar intraepithelial neoplasia III) [D07.1])    Surgeons: Dilcia Quiles MD Responsible Provider: Jsesica Gentile MD    Anesthesia Type: MAC ASA Status: 2            Anesthesia Type: MAC    Vitals Value Taken Time   /72 09/04/24 1413   Temp 36 °C (96.8 °F) 09/04/24 1413   Pulse 72 09/04/24 1413   Resp 16 09/04/24 1413   SpO2 98 % 09/04/24 1413       Anesthesia Post Evaluation    Patient location during evaluation: PACU  Patient participation: complete - patient participated  Level of consciousness: awake  Pain management: adequate  Airway patency: patent  Cardiovascular status: acceptable  Respiratory status: acceptable  Hydration status: acceptable  Postoperative Nausea and Vomiting: none        There were no known notable events for this encounter.

## 2024-09-27 LAB
LAB AP BLOCK FOR ADDITIONAL STUDIES: NORMAL
LABORATORY COMMENT REPORT: NORMAL
PATH REPORT.FINAL DX SPEC: NORMAL
PATH REPORT.GROSS SPEC: NORMAL
PATH REPORT.RELEVANT HX SPEC: NORMAL
PATH REPORT.TOTAL CANCER: NORMAL
PATHOLOGY SYNOPTIC REPORT: NORMAL

## 2024-10-02 NOTE — PROGRESS NOTES
"Patient ID: Nia Ontiveros is a 58 y.o. female.  Referring Physician: No referring provider defined for this encounter.  Primary Care Provider: No Assigned PCP Generic Provider, MD      Subjective    HPI: Nia Ontiveros is a 58yF presenting for follow-up of HEAVEN III. She reports severe vulvar pruritis and pain since  that she describes as a burning, stabbing 10/10 pain that worsens when sitting. She has tried lidocaine and steroid cream that have slightly relieved the itching and pain. She previously took 600 mg advil daily that she stopped due to GI upset. She endorses other symptoms such as cough with phlegm, right rib pain, abdominal bloating, and LLE edema.     Interval History:  She is experiencing pain, she is sitting on a donut, yesterday was the 1st day she didn't have to wear a pad. Otherwise she has bursitis and worsening vertigo and requested a PCP referral. We discussed diagnostic/treatment imaging and procedures in detail.    They deny fever, chills, constipation, diarrhea, vaginal bleeding, abdominal pain, nausea, vomiting, or any other symptoms other than those listed in the interval history.    PMH:  - Asthma controlled on albuterol inhalers PRN  - Vertigo   - Vulvar cancer    PSH:  - Breast biopsy, benign mass    OBHx:  The patient is a . Menarche was at 18. She underwent menopause at 36. She did not use HRT.    Social:  She endorses drinking 4-5 beers daily, smoking history of 1-1.5 packs for 30 years. No recreational drug use. The patient lives at home alone with her 2 dogs. 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     FamHx:  Great great grandmother with \"female\" cancer, unsure of the type of cancer. Grandfather with lung cancer. Other family history of T1D, dementia.     Their history is otherwise negative for a history of breast, ovarian, uterine, colon, pancreatic, and GI cancer.     Screening:  Cervical cancer: Pap smear 24 with ASCUS, positive " HPV 16   Mammogram:  None  Colonoscopy: None      Review of Systems - Oncology     Objective   BSA: 1.63 meters squared  /90   Pulse 90   Temp 36.3 °C (97.3 °F)   Resp 18   Wt 57.4 kg (126 lb 8.7 oz)   SpO2 97%   BMI 20.74 kg/m²      No family history on file.    Nia Ontiveros  reports that she has been smoking cigarettes. She has been exposed to tobacco smoke. She has never used smokeless tobacco.  She  reports current alcohol use.  She  reports no history of drug use.    Physical Exam  Constitutional:       Appearance: Normal appearance. She is normal weight.   HENT:      Head: Normocephalic.      Mouth/Throat:      Mouth: Mucous membranes are moist.   Eyes:      Pupils: Pupils are equal, round, and reactive to light.   Cardiovascular:      Rate and Rhythm: Normal rate and regular rhythm.      Heart sounds: Normal heart sounds. No murmur heard.     No friction rub. No gallop.   Pulmonary:      Effort: Pulmonary effort is normal.      Breath sounds: Normal breath sounds.   Abdominal:      General: Abdomen is flat. Bowel sounds are normal.      Palpations: Abdomen is soft.   Genitourinary:     Labia:         Right: No lesion.         Left: Tenderness and lesion present. No rash or injury.           Comments: Well healing wide local excision site on the clitoris and on the right labia. Left labia minora noted to have warty like lesions with some hypo and hyper pigmentation   Musculoskeletal:         General: No swelling or tenderness.   Skin:     General: Skin is warm and dry.   Neurological:      General: No focal deficit present.      Mental Status: She is alert and oriented to person, place, and time.       Surgical Pathology Exam: [id number]  Order: [id number]   Collected 9/4/2024 13:10       Status: Final result       Visible to patient: Yes (seen)       Dx: HEAVEN III (vulvar intraepithelial neopl...    0 Result Notes      Component    FINAL DIAGNOSIS   A. LEFT VAGINAL WALL BIOPSY:   -- SQUAMOUS  EPITHELIUM; NO PATHOLOGIC DIAGNOSIS     B. CERVIX AT 3 O'CLOCK BIOPSY:   -- FIBROUS STROMA; NO SQUAMOUS EPITHELIUM IDENTIFIED     C. ENDOCERVIX CURETTINGS:   -- DETACHED SQUAMOUS EPITHELIUM WITH LOW-GRADE SQUAMOUS INTRAEPITHELIAL LESION (SVETA-1); SCANT UNREMARKABLE ENDOCERVICAL EPITHELIUM     D. VULVA, WIDE LOCAL EXCISION:   -- HIGH-GRADE SQUAMOUS INTRAEPITHELIAL LESION (HEAVEN-3, USUAL TYPE), INVOLVING THE PERIPHERAL MARGINS OF RESECTION FROM 10:00 TO 6:00     E.  VULVA WITH CLITORIS LESION AT 12 O'CLOCK, WIDE LOCAL EXCISION:    -- INVASIVE SQUAMOUS CELL CARCINOMA, MODERATELY DIFFERENTIATED, INVOLVING THE RIGHT LABIA MINORA AT 8 TO 12:00 (6 MM MAXIMUM DEPTH OF INVASION)  --EXTENSIVE HIGH-GRADE SQUAMOUS INTRAEPITHELIAL LESION (HEAVEN 3, USUAL AND WARTY TYPES) INVOLVING THE ENTIRE PERIPHERAL AND PERIURETHRAL MARGIN OF RESECTION  --PERIPHERAL, PERIURETHRAL, AND DEEP MARGINS OF RESECTION ARE NEGATIVE FOR INVASIVE CARCINOMA   Electronically signed by Jet Melton MD on 9/27/2024 at 0739        By the signature on this report, the individual or group listed as making the Final Interpretation/Diagnosis certifies that they have reviewed this case.    Case Summary Report   VULVA   8th Edition - Protocol posted: 12/17/2021VULVA - All Specimens  SPECIMEN   Procedure  Wide excision   TUMOR   Tumor Focality  Unifocal   Tumor Site  Right vulva   Tumor Size  Greatest Dimension (Centimeters): 1.3 cm   Histologic Type  Squamous cell carcinoma, HPV-associated   Histologic Grade  G2, moderately differentiated   Depth of Tumor Invasion  6 mm   Tumor Border  Infiltrating   Other Tissue / Organ Involvement  Not applicable   Lymphovascular Invasion  Not identified   MARGINS   Margin Status for Invasive Carcinoma  All margins negative for invasive carcinoma   Closest Margin(s) to Invasive Carcinoma  Peripheral   Distance from Invasive Carcinoma to Closest Margin  6 mm mm   Margin Status for HSIL (VIN2-3) or dVIN  High-grade squamous  intraepithelial lesion (HSIL) present at margin   Margin(s) Involved by HSIL  Peripheral: Entire margin of resection in specimen E, 10 to 6 o'clock in specimen D     REGIONAL LYMPH NODES   Regional Lymph Node Status  Not applicable (no regional lymph nodes submitted or found)   PATHOLOGIC STAGE CLASSIFICATION (pTNM, AJCC 8th ed.)   Reporting of pT, pN, and (when applicable) pM categories is based on information available to the pathologist at the time the report is issued. As per the AJCC (Chapter 1, 8th Ed.) it is the managing physician’s responsibility to establish the final pathologic stage based upon all pertinent information, including but potentially not limited to this pathology report.   pT Category  pT1b   pN Category  pN not assigned (cannot be determined based on available pathological information)   FIGO STAGE   FIGO Stage  IB   ADDITIONAL FINDINGS   Additional Findings  None identified             Oncology History Overview Note   9/4/24- WLE with at least stage Ib SCC of the vulva     Vulvar cancer (Multi)   10/3/2024 Initial Diagnosis    Vulvar cancer (Multi)           Assessment/Plan    58 y.o. 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 s/p WLE with negative margins for malignancy, but positive for HEAVEN in 9/2024, here for post-op    #Vulvar cancer  - We discussed her pathology  - We discussed the pathophysiology of vulvar cancer, its relationship to cancer and frequent association with HPV   - We discussed treatment options going forward including my recommendation for imaging to evaluate the lymph nodes  - Plan for pelvic MRI and PET  - We also discussed plan for IFSLN mapping and biopsy   - Phone visit after imaging    #Cervical dysplasia  - We discussed the pathophysiology of cervical dyplasia, its relationship to cancer and frequent association with HPV   - Hx of HPV exposure, pap smear 7/2024 with ASCUS and HPV 16 positive  - Colposcopy/ bxs with SVETA 1  - Pap in 6 months      Scribe Attestation  By  signing my name below, I, Emeli Saba   attest that this documentation has been prepared under the direction and in the presence of Dilcia Quiles MD.     Provider Attestation - Scribe documentation    All medical record entries made by the Scribe were at my direction and personally dictated by me. I have reviewed the chart and agree that the record accurately reflects my personal performance of the history, physical exam, discussion and plan.    Dilcia Quiles MD

## 2024-10-03 ENCOUNTER — OFFICE VISIT (OUTPATIENT)
Dept: GYNECOLOGIC ONCOLOGY | Facility: CLINIC | Age: 59
End: 2024-10-03
Payer: MEDICARE

## 2024-10-03 VITALS
RESPIRATION RATE: 18 BRPM | OXYGEN SATURATION: 97 % | WEIGHT: 126.54 LBS | TEMPERATURE: 97.3 F | BODY MASS INDEX: 20.74 KG/M2 | SYSTOLIC BLOOD PRESSURE: 155 MMHG | DIASTOLIC BLOOD PRESSURE: 90 MMHG | HEART RATE: 90 BPM

## 2024-10-03 DIAGNOSIS — J45.909 ASTHMA, UNSPECIFIED ASTHMA SEVERITY, UNSPECIFIED WHETHER COMPLICATED, UNSPECIFIED WHETHER PERSISTENT (HHS-HCC): ICD-10-CM

## 2024-10-03 DIAGNOSIS — B97.7 HPV IN FEMALE: ICD-10-CM

## 2024-10-03 DIAGNOSIS — C51.9 VULVAR CANCER (MULTI): Primary | ICD-10-CM

## 2024-10-03 PROCEDURE — 99211 OFF/OP EST MAY X REQ PHY/QHP: CPT | Mod: 24 | Performed by: STUDENT IN AN ORGANIZED HEALTH CARE EDUCATION/TRAINING PROGRAM

## 2024-10-03 PROCEDURE — 4004F PT TOBACCO SCREEN RCVD TLK: CPT | Performed by: STUDENT IN AN ORGANIZED HEALTH CARE EDUCATION/TRAINING PROGRAM

## 2024-10-03 RX ORDER — ALBUTEROL SULFATE 90 UG/1
2 INHALANT RESPIRATORY (INHALATION) EVERY 6 HOURS PRN
Qty: 18 G | Refills: 11 | Status: SHIPPED | OUTPATIENT
Start: 2024-10-03 | End: 2025-10-03

## 2024-10-03 ASSESSMENT — PATIENT HEALTH QUESTIONNAIRE - PHQ9
SUM OF ALL RESPONSES TO PHQ9 QUESTIONS 1 AND 2: 0
2. FEELING DOWN, DEPRESSED OR HOPELESS: NOT AT ALL
1. LITTLE INTEREST OR PLEASURE IN DOING THINGS: NOT AT ALL

## 2024-10-03 ASSESSMENT — PAIN SCALES - GENERAL: PAINLEVEL: 0-NO PAIN

## 2024-10-03 ASSESSMENT — COLUMBIA-SUICIDE SEVERITY RATING SCALE - C-SSRS
2. HAVE YOU ACTUALLY HAD ANY THOUGHTS OF KILLING YOURSELF?: NO
6. HAVE YOU EVER DONE ANYTHING, STARTED TO DO ANYTHING, OR PREPARED TO DO ANYTHING TO END YOUR LIFE?: NO
1. IN THE PAST MONTH, HAVE YOU WISHED YOU WERE DEAD OR WISHED YOU COULD GO TO SLEEP AND NOT WAKE UP?: NO

## 2024-10-03 ASSESSMENT — ENCOUNTER SYMPTOMS
DEPRESSION: 0
LOSS OF SENSATION IN FEET: 0
OCCASIONAL FEELINGS OF UNSTEADINESS: 0

## 2024-10-08 DIAGNOSIS — C51.9 VULVAR CANCER (MULTI): Primary | ICD-10-CM

## 2024-10-11 ENCOUNTER — TELEPHONE (OUTPATIENT)
Dept: GYNECOLOGIC ONCOLOGY | Facility: HOSPITAL | Age: 59
End: 2024-10-11
Payer: MEDICARE

## 2024-10-11 NOTE — TELEPHONE ENCOUNTER
"Patient called to report increasing pain of left vulva.    Patient states left vulvar lesion has increased in size and now extends to \"entrance of vagina\" and nearing stiches of right wide local excision vulvar site.    Denies drainage from left vulvar lesion.  Patient reports increased left vulvar pain and irritation.    Asking if she can use Clobetasol or lidocaine cream to area.      Right vulvar incision site well approximated without drainage.       Message routed to Dr. Quiles    [id number]  Phoned patient to notify that Dr. Quiles states she can resume Cobetasol and Lidocaine creams.      "

## 2024-10-17 ENCOUNTER — HOSPITAL ENCOUNTER (OUTPATIENT)
Dept: RADIOLOGY | Facility: HOSPITAL | Age: 59
Discharge: HOME | End: 2024-10-17
Payer: MEDICARE

## 2024-10-17 ENCOUNTER — APPOINTMENT (OUTPATIENT)
Dept: RADIOLOGY | Facility: CLINIC | Age: 59
End: 2024-10-17
Payer: MEDICARE

## 2024-10-17 DIAGNOSIS — C51.9 VULVAR CANCER (MULTI): ICD-10-CM

## 2024-10-17 LAB — GLUCOSE BLD MANUAL STRIP-MCNC: 97 MG/DL (ref 74–99)

## 2024-10-17 PROCEDURE — A9575 INJ GADOTERATE MEGLUMI 0.1ML: HCPCS | Performed by: STUDENT IN AN ORGANIZED HEALTH CARE EDUCATION/TRAINING PROGRAM

## 2024-10-17 PROCEDURE — 78813 PET IMAGE FULL BODY: CPT | Mod: PI

## 2024-10-17 PROCEDURE — 3430000001 HC RX 343 DIAGNOSTIC RADIOPHARMACEUTICALS: Performed by: STUDENT IN AN ORGANIZED HEALTH CARE EDUCATION/TRAINING PROGRAM

## 2024-10-17 PROCEDURE — 82947 ASSAY GLUCOSE BLOOD QUANT: CPT

## 2024-10-17 PROCEDURE — 96372 THER/PROPH/DIAG INJ SC/IM: CPT | Performed by: STUDENT IN AN ORGANIZED HEALTH CARE EDUCATION/TRAINING PROGRAM

## 2024-10-17 PROCEDURE — A9552 F18 FDG: HCPCS | Performed by: STUDENT IN AN ORGANIZED HEALTH CARE EDUCATION/TRAINING PROGRAM

## 2024-10-17 PROCEDURE — 2500000004 HC RX 250 GENERAL PHARMACY W/ HCPCS (ALT 636 FOR OP/ED): Mod: JZ | Performed by: STUDENT IN AN ORGANIZED HEALTH CARE EDUCATION/TRAINING PROGRAM

## 2024-10-17 PROCEDURE — 72197 MRI PELVIS W/O & W/DYE: CPT

## 2024-10-17 PROCEDURE — 2550000001 HC RX 255 CONTRASTS: Performed by: STUDENT IN AN ORGANIZED HEALTH CARE EDUCATION/TRAINING PROGRAM

## 2024-10-17 RX ORDER — FLUDEOXYGLUCOSE F 18 200 MCI/ML
11 INJECTION, SOLUTION INTRAVENOUS
Status: COMPLETED | OUTPATIENT
Start: 2024-10-17 | End: 2024-10-17

## 2024-10-17 RX ORDER — GADOTERATE MEGLUMINE 376.9 MG/ML
0.2 INJECTION INTRAVENOUS
Status: COMPLETED | OUTPATIENT
Start: 2024-10-17 | End: 2024-10-17

## 2024-10-21 NOTE — PROGRESS NOTES
Patient ID: Nia Ontiveros is a 58 y.o. female.  Referring Physician: No referring provider defined for this encounter.  Primary Care Provider: No Assigned PCP Generic Provider, MD      Virtual or Telephone Consent    A telephone visit (audio only) between the patient (at the originating site) and the provider (at the distant site) was utilized to provide this telehealth service.   Verbal consent was requested and obtained from Nia Ontiveros on this date, 10/22/24 for a telehealth visit.       Subjective    HPI: Nia Ontiveros is a 58yF presenting for follow-up of HEAVEN III. She reports severe vulvar pruritis and pain since June that she describes as a burning, stabbing 10/10 pain that worsens when sitting. She has tried lidocaine and steroid cream that have slightly relieved the itching and pain. She previously took 600 mg advil daily that she stopped due to GI upset. She endorses other symptoms such as cough with phlegm, right rib pain, abdominal bloating, and LLE edema.     Interval History:  Nia is overall nervous about the results of her imaging. She does note that the lesion that was on the L labia the last time I saw her has now spread and she notices it on the labia near the anus near the vaginal opening, she is concerned about the fact that it continues to come back and that it came back so quickly. She wanted to know if there were other alternatives for treatment such as Imiquimod. She also wanted to know why the image report was not back yet. She had several questions related to the surgery. And she also stated that in the event that vulnerable sites do not map for her Pala Lymph nodes she does not want to proceed with the full lymphadenectomy in the groins because she does not want to risk having Lymphadema.     They deny fever, chills, constipation, diarrhea, vaginal bleeding, abdominal pain, nausea, vomiting, or any other symptoms other than those listed in the interval  "history.    PMH:  - Asthma controlled on albuterol inhalers PRN  - Vertigo   - Vulvar cancer    PSH:  - Breast biopsy, benign mass    OBHx:  The patient is a . Menarche was at 18. She underwent menopause at 36. She did not use HRT.    Social:  She endorses drinking 4-5 beers daily, smoking history of 1-1.5 packs for 30 years. No recreational drug use. The patient lives at home alone with her 2 dogs. 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     FamHx:  Great great grandmother with \"female\" cancer, unsure of the type of cancer. Grandfather with lung cancer. Other family history of T1D, dementia.     Their history is otherwise negative for a history of breast, ovarian, uterine, colon, pancreatic, and GI cancer.     Screening:  Cervical cancer: Pap smear 26 with ASCUS, positive HPV 16   Mammogram:  None  Colonoscopy: None      Review of Systems - Oncology     Objective   BSA: There is no height or weight on file to calculate BSA.  There were no vitals taken for this visit.     No family history on file.    Nia Ontiveros  reports that she has been smoking cigarettes. She has been exposed to tobacco smoke. She has never used smokeless tobacco.  She  reports current alcohol use.  She  reports no history of drug use.    Physical Exam  Surgical Pathology Exam: [id number]  Order: [id number]   Collected 2024 13:10       Status: Final result       Visible to patient: Yes (seen)       Dx: HEAVEN III (vulvar intraepithelial neopl...    0 Result Notes      Component    FINAL DIAGNOSIS   A. LEFT VAGINAL WALL BIOPSY:   -- SQUAMOUS EPITHELIUM; NO PATHOLOGIC DIAGNOSIS     B. CERVIX AT 3 O'CLOCK BIOPSY:   -- FIBROUS STROMA; NO SQUAMOUS EPITHELIUM IDENTIFIED     C. ENDOCERVIX CURETTINGS:   -- DETACHED SQUAMOUS EPITHELIUM WITH LOW-GRADE SQUAMOUS INTRAEPITHELIAL LESION (SVETA-1); SCANT UNREMARKABLE ENDOCERVICAL EPITHELIUM     D. VULVA, WIDE LOCAL EXCISION:   -- HIGH-GRADE SQUAMOUS INTRAEPITHELIAL " LESION (HEAVEN-3, USUAL TYPE), INVOLVING THE PERIPHERAL MARGINS OF RESECTION FROM 10:00 TO 6:00     E.  VULVA WITH CLITORIS LESION AT 12 O'CLOCK, WIDE LOCAL EXCISION:    -- INVASIVE SQUAMOUS CELL CARCINOMA, MODERATELY DIFFERENTIATED, INVOLVING THE RIGHT LABIA MINORA AT 8 TO 12:00 (6 MM MAXIMUM DEPTH OF INVASION)  --EXTENSIVE HIGH-GRADE SQUAMOUS INTRAEPITHELIAL LESION (HEAVEN 3, USUAL AND WARTY TYPES) INVOLVING THE ENTIRE PERIPHERAL AND PERIURETHRAL MARGIN OF RESECTION  --PERIPHERAL, PERIURETHRAL, AND DEEP MARGINS OF RESECTION ARE NEGATIVE FOR INVASIVE CARCINOMA   Electronically signed by Jet Melton MD on 9/27/2024 at 0739        By the signature on this report, the individual or group listed as making the Final Interpretation/Diagnosis certifies that they have reviewed this case.    Case Summary Report   VULVA   8th Edition - Protocol posted: 12/17/2021VULVA - All Specimens  SPECIMEN   Procedure  Wide excision   TUMOR   Tumor Focality  Unifocal   Tumor Site  Right vulva   Tumor Size  Greatest Dimension (Centimeters): 1.3 cm   Histologic Type  Squamous cell carcinoma, HPV-associated   Histologic Grade  G2, moderately differentiated   Depth of Tumor Invasion  6 mm   Tumor Border  Infiltrating   Other Tissue / Organ Involvement  Not applicable   Lymphovascular Invasion  Not identified   MARGINS   Margin Status for Invasive Carcinoma  All margins negative for invasive carcinoma   Closest Margin(s) to Invasive Carcinoma  Peripheral   Distance from Invasive Carcinoma to Closest Margin  6 mm mm   Margin Status for HSIL (VIN2-3) or dVIN  High-grade squamous intraepithelial lesion (HSIL) present at margin   Margin(s) Involved by HSIL  Peripheral: Entire margin of resection in specimen E, 10 to 6 o'clock in specimen D     REGIONAL LYMPH NODES   Regional Lymph Node Status  Not applicable (no regional lymph nodes submitted or found)   PATHOLOGIC STAGE CLASSIFICATION (pTNM, AJCC 8th ed.)   Reporting of pT, pN, and (when  applicable) pM categories is based on information available to the pathologist at the time the report is issued. As per the AJCC (Chapter 1, 8th Ed.) it is the managing physician’s responsibility to establish the final pathologic stage based upon all pertinent information, including but potentially not limited to this pathology report.   pT Category  pT1b   pN Category  pN not assigned (cannot be determined based on available pathological information)   FIGO STAGE   FIGO Stage  IB   ADDITIONAL FINDINGS   Additional Findings  None identified           NM PET MR whole body, MR pelvis w and wo IV contrast  Narrative: Interpreted By:  Magali Mark,   STUDY:  MR PELVIS W AND WO IV CONTRAST; NM PET MR WHOLE BODY; 10/17/2024  11:24 am; 10/17/2024 11:27 am      INDICATION:  Signs/Symptoms: Vulvar cancer, screening for metastasis.  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      COMPARISON:  None.      ACCESSION NUMBER(S):  [id number]; [id number]      ORDERING CLINICIAN:  JOVANI MICHELLE      TECHNIQUE:  PROCEDURE:  The patient received an intravenous dose of  11.0 mCi of  Fluorine-18 fluorodeoxyglucose (FDG).   Positron emission tomographic  (PET) images from skull base to mid-thigh were then acquired after a  one hour delay.  Also acquired was a contemporaneous whole body MR  scan (T1w 3D GRE, atMR) for attenuation correction of PET images and  anatomic localization supported by a whole body mDIXON (T1w four  tissue IP,OP,WATER, FAT).  The PET and WB-MR images were digitally  fused for display.  All images were acquired on a combined PET/MR  scanner unit.      The patient received oral hydration.      CODING:      Initial Treatment Strategy (PI)      CALIBRATION:      Dose Injection-to-Scan Interval (mins): 66 min  Mediastinal  bloodpool SUV (normal 1.5-2.5): 1.5  Blood glucose: 97 mg/dL      FINDINGS:  NECK:  There is moderate diffuse hypermetabolic activity throughout the  thyroid gland with maximum SUV of 5.8. No additional focal  hypermetabolic soft tissue lesion is seen in the neck. No  hypermetabolic cervical lymphadenopathy is present.      CHEST:  No focal hypermetabolic lesion is seen in the lung parenchyma.  No evidence of hypermetabolic mediastinal, hilar or axillary  lymphadenopathy.      ABDOMEN AND PELVIS:  No hypermetabolic soft tissue lesion is present in the abdomen and  pelvis. There is a 3.9 cm left adrenal lesion which does not  demonstrate hypermetabolic activity. No evidence of hypermetabolic  lymphadenopathy. Physiologic radiotracer uptake is present in the  liver and spleen with excretion into the bowel loops and the  genitourinary tract.      MUSCULOSKELETAL:  There is no focal hypermetabolic lesion to suggest osseous metastasis.  There is mild hypermetabolic activity about the right vulva with  maximum SUV of 2.4 without focal hypermetabolic activity to suggest  residual or recurrent disease.                  MRI OF THE PELVIS and WHOLE BODY:      TECHNIQUE: Axial and coronal T2w SSH sequences, axial T2 SSH  sequences with fat saturation,  DWI-EPI,  and pre, DCE and delayed  post gadolinium 3D T1 gradient echo sequences (eTHRIVE) were acquired  in axial and coronal reconstructions. Post contrast whole body images  from the skull base to the symphysis pubis (mDIXON) were also  obtained for localization purposes in PET/MR.    11 mL of Dotarem  gadolinium based contrast agent were administered intravenously.      FINDINGS:      NECK: The structures of the neck are symmetrical and grossly  unremarkable. There is no lymphadenopathy or mass lesion seen.      CHEST:  No lymphadenopathy is noted in the axillary, mediastinal or hilar  regions bilaterally.   No pulmonary nodules with enhancement are  identified in the  lungs. Please note that sensitivity of MRI for  detection of lung nodules <5mm is limited. The heart is within upper  normal limits in size. There is no gross evidence of pleural or  pericardial effusion based on T1w images.          MRI of the LIVER:  The liver is normal in size.  No focal liver lesions are identified in single phase post Gd imaging.      There is no biliary dilatation. The gallbladder appears within normal  limits.      ABDOMEN:  Pancreas, right adrenal gland and spleen are unremarkable on single  phase post Gd images. There is a T1 hypointense lesion with rising  from the left adrenal gland on fat saturated images. This mass  demonstrates increased signal on out of phase imaging.      The kidneys enhance symmetrically in late venous phase. No  hydronephrosis is noted.      The aorta is normal in caliber and course. There is no abdominopelvic  lymphadenopathy.      The bowel appears within normal limits. There is diverticulosis of  the sigmoid colon. There is no abnormal dilatation, wall thickening  or stenosis.      PELVIS:      UTERUS:  The uterus is anteverted and measures 2.0 x 4.1 x 5.0 cm.  The  thickness of the junctional zone is within normal limits.  The  endometrium has normal thickness and appearance. No uterine masses  are identified. The vagina is unremarkable. Postsurgical changes of  vulvectomy. There is no abnormal enhancement, restricted diffusion,  or soft tissue nodularity within the surgical bed to suggest residual  neoplasm.      OVARIES/ADNEXA:      RIGHT:  The right ovary is normal in size and appears unremarkable.  There is  no cystic or solid mass or endometriosis. There is no hydrosalpinx.      LEFT:  The left ovary is normal in size and appears unremarkable.  There is  no cystic or solid mass or endometriosis. There is no hydrosalpinx.      PERITONEAL FLUID:  No  free or loculated fluid collections are evident in the pelvis.      PELVIC LYMPH NODES:  No abnormally  enlarged pelvic lymph nodes are identified.      SKELETON:  No abnormal enhancing osseous lesions are evident.      Impression: PET:  1. Mild diffuse hypermetabolic activity involving the right vulva is  likely postprocedural in nature. No evidence of hypermetabolic local  residual or recurrent disease.  2. No hypermetabolic harris or distant metastatic disease.  3. Moderate diffuse hypermetabolic activity throughout the thyroid  gland compatible with thyroiditis.  4. A 3.9 cm left adrenal lesion does not demonstrate hypermetabolic  activity.      MRI:  1. Postsurgical changes of vulvectomy with no abnormal soft tissue  nodularity or enhancement in the surgical bed to suggest residual  viable neoplasm by MRI.  2. Indeterminate left adrenal gland lesion. Further evaluation with  CT adrenal protocol is recommended.          I personally reviewed the images/study and I agree with the findings  as stated by resident physician Dr. Magali Alfonso. This  study was interpreted at [place], [place].      MACRO:  None          Dictation workstation:   [id number]       Oncology History Overview Note   9/4/24- WLE with at least stage Ib SCC of the vulva     Vulvar cancer (Multi)   10/3/2024 Initial Diagnosis    Vulvar cancer (Multi)           Assessment/Plan    58 y.o. 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 s/p WLE with negative margins for malignancy, but positive for HEAVEN in 9/2024, here for imaging review    #Vulvar cancer  - We discussed her pathology  - We discussed the pathophysiology of vulvar cancer, its relationship to cancer and frequent association with HPV   - We discussed treatment options going forward including my recommendation for imaging to evaluate the lymph nodes  - Reviewed PET/MRI which was negative for metastatic disease  - We also discussed plan for IFSLN mapping and biopsy     # Cervical dysplasia  - We discussed the pathophysiology of cervical  dyplasia, its relationship to cancer and frequent association with HPV   - Hx of HPV exposure, pap smear 7/2024 with ASCUS and HPV 16 positive  - Colposcopy/ bxs with SVETA 1  - Pap in 6 months  - If sentinel lymph nodes were to not map the patient declines a full inguinal femoral lymphadenectomy.     I spent 27 minutes virtually or in a telephone with this patient and/or family. More than 50% of the time was spent in counseling and/or coordination of care.      Scribe Attestation  By signing my name below, I, Emeli Saba   attest that this documentation has been prepared under the direction and in the presence of Dilcia Quiles MD.     Provider Attestation - Scribe documentation    All medical record entries made by the Scribe were at my direction and personally dictated by me. I have reviewed the chart and agree that the record accurately reflects my personal performance of the history, physical exam, discussion and plan.    Dilcia Quiles MD

## 2024-10-22 ENCOUNTER — TELEMEDICINE (OUTPATIENT)
Dept: GYNECOLOGIC ONCOLOGY | Facility: CLINIC | Age: 59
End: 2024-10-22
Payer: MEDICARE

## 2024-10-22 DIAGNOSIS — R93.89 ABNORMAL FINDING ON IMAGING: ICD-10-CM

## 2024-10-22 DIAGNOSIS — Z72.0 TOBACCO USE: ICD-10-CM

## 2024-10-22 DIAGNOSIS — C51.9 VULVAR CANCER (MULTI): Primary | ICD-10-CM

## 2024-10-22 PROCEDURE — 99443 PR PHYS/QHP TELEPHONE EVALUATION 21-30 MIN: CPT | Performed by: STUDENT IN AN ORGANIZED HEALTH CARE EDUCATION/TRAINING PROGRAM

## 2024-10-22 RX ORDER — IBUPROFEN 200 MG
1 TABLET ORAL EVERY 24 HOURS
Qty: 30 PATCH | Refills: 0 | Status: SHIPPED | OUTPATIENT
Start: 2024-10-22 | End: 2024-11-21

## 2024-10-23 DIAGNOSIS — R93.89 ABNORMAL FINDING ON IMAGING: Primary | ICD-10-CM

## 2024-10-24 ENCOUNTER — TELEPHONE (OUTPATIENT)
Dept: GYNECOLOGIC ONCOLOGY | Facility: HOSPITAL | Age: 59
End: 2024-10-24
Payer: MEDICARE

## 2024-10-24 NOTE — TELEPHONE ENCOUNTER
"Phoned patient per Dr. Zamora recommendation to notify that MRI showed a spot in the adrenal gland.   Notified patient that this area did not light up on scan and radiologist recommended another set of pictures to evaluate.    Notified patient that Dr Quiles does not think this area is cancer and therefore will arrange for additional imaging after surgery on 11/8    Patient verbalized her understanding of information given and requesting that CT of adrenal glands be scheduled prior to 11/8 surgery.   Dr. Quiles notified and entered order for CT.   Messaged Josette Goodwin requesting she contact patient with CT appointment information.   Patient also mentioned that she is experiencing \"opposite symptoms\" of thyroiditis findings on MRI.   Patient reports decreased appetite and sleeps without difficulty, decreased energy.   Dr. Quiles updated with patient symptoms.                  "

## 2024-10-25 ENCOUNTER — TELEPHONE (OUTPATIENT)
Dept: GYNECOLOGIC ONCOLOGY | Facility: HOSPITAL | Age: 59
End: 2024-10-25
Payer: MEDICARE

## 2024-10-25 DIAGNOSIS — R30.0 DYSURIA: Primary | ICD-10-CM

## 2024-10-25 NOTE — TELEPHONE ENCOUNTER
"The patient sent the following [employer] message:  \"There appears to be blood in my urine. It started last night and I thought it had stopped, but it has started back up again. Is this concerning? Could it be from the recent scan? Or the adrenal gland thing? Or maybe the Covid booster i had on Wednesday\"    I talked with Nia and she denies any other symptoms. She said it started last night and was red to pink in color and was only one episode. She said it happened again this morning, red to pink with urination. Nia said she is wearing a pad and there is no blood on the pad. I forwarded the message to the physician and the physician recommended a urine culture. I called the patient and a she agreed with the recommendation.   "

## 2024-10-28 ENCOUNTER — APPOINTMENT (OUTPATIENT)
Dept: PRIMARY CARE | Facility: CLINIC | Age: 59
End: 2024-10-28
Payer: MEDICARE

## 2024-10-28 ENCOUNTER — LAB (OUTPATIENT)
Dept: LAB | Facility: LAB | Age: 59
End: 2024-10-28
Payer: MEDICARE

## 2024-10-28 DIAGNOSIS — R30.0 DYSURIA: ICD-10-CM

## 2024-10-28 PROCEDURE — 81001 URINALYSIS AUTO W/SCOPE: CPT

## 2024-10-29 LAB
APPEARANCE UR: ABNORMAL
BACTERIA #/AREA URNS AUTO: ABNORMAL /HPF
BILIRUB UR STRIP.AUTO-MCNC: NEGATIVE MG/DL
COLOR UR: COLORLESS
GLUCOSE UR STRIP.AUTO-MCNC: NORMAL MG/DL
KETONES UR STRIP.AUTO-MCNC: NEGATIVE MG/DL
LEUKOCYTE ESTERASE UR QL STRIP.AUTO: ABNORMAL
MUCOUS THREADS #/AREA URNS AUTO: ABNORMAL /LPF
NITRITE UR QL STRIP.AUTO: NEGATIVE
PH UR STRIP.AUTO: 6 [PH]
PROT UR STRIP.AUTO-MCNC: NEGATIVE MG/DL
RBC # UR STRIP.AUTO: ABNORMAL /UL
RBC #/AREA URNS AUTO: ABNORMAL /HPF
SP GR UR STRIP.AUTO: 1.01
SQUAMOUS #/AREA URNS AUTO: ABNORMAL /HPF
UROBILINOGEN UR STRIP.AUTO-MCNC: NORMAL MG/DL
WBC #/AREA URNS AUTO: >50 /HPF
WBC CLUMPS #/AREA URNS AUTO: ABNORMAL /HPF

## 2024-10-30 DIAGNOSIS — N30.01 ACUTE CYSTITIS WITH HEMATURIA: Primary | ICD-10-CM

## 2024-10-30 RX ORDER — SULFAMETHOXAZOLE AND TRIMETHOPRIM 800; 160 MG/1; MG/1
1 TABLET ORAL 2 TIMES DAILY
Qty: 14 TABLET | Refills: 0 | Status: SHIPPED | OUTPATIENT
Start: 2024-10-30 | End: 2024-11-08 | Stop reason: HOSPADM

## 2024-10-31 NOTE — HOSPITAL COURSE
[/] PAT - no need?  [/] Plan for overnight obs? - no? Not mentioned  [ ] Consent - NEEDS  [ ] Preop meds  [x] Add to list    Gynecologic Surgery History and Physical    Subjective   Nia Ontiveros is a 58 y.o. 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     PMH: asthma, cervical dysplasia   PSH: breast biopsy (benign)  Imaging:   PET MR 10/3:  PET:  1. Mild diffuse hypermetabolic activity involving the right vulva is  likely postprocedural in nature. No evidence of hypermetabolic local  residual or recurrent disease.  2. No hypermetabolic harris or distant metastatic disease.  3. Moderate diffuse hypermetabolic activity throughout the thyroid  gland compatible with thyroiditis.  4. A 3.9 cm left adrenal lesion does not demonstrate hypermetabolic  activity.      MRI:  1. Postsurgical changes of vulvectomy with no abnormal soft tissue  nodularity or enhancement in the surgical bed to suggest residual  viable neoplasm by MRI.  2. Indeterminate 3 cm left adrenal gland lesion. Further evaluation  with CT adrenal protocol is recommended.      Obstetrical History   OB History    Para Term  AB Living   2 2 2     4   SAB IAB Ectopic Multiple Live Births         2 4      # Outcome Date GA Lbr Demetrius/2nd Weight Sex Type Anes PTL Lv   2 Term            1 Term                Past Medical History  Past Medical History:   Diagnosis Date    BPPV (benign paroxysmal positional vertigo)         Past Surgical History   Past Surgical History:   Procedure Laterality Date    BREAST LUMPECTOMY  2014    Breast Surgery Lumpectomy    CERVIX LESION DESTRUCTION       SECTION, CLASSIC  2014     Section     SECTION, LOW TRANSVERSE      HERNIA REPAIR  2014    Hernia Repair    TUBAL LIGATION  2014    Tubal Ligation       Social History  Social History      Tobacco Use    Smoking status: Every Day     Types: Cigarettes     Passive exposure: Current    Smokeless tobacco: Never    Tobacco comments:     1ppd x 30yr   Substance Use Topics    Alcohol use: Yes     Substance and Sexual Activity   Drug Use Never       Allergies  Patient has no known allergies.     Medications  No medications prior to admission.       ROS: negative except per HPI    Objective    Last Vitals  There were no vitals taken for this visit.    Physical Examination  General: no acute distress  HEENT: normocephalic, atraumatic  Heart: warm and well perfused  Lungs: breathing comfortably on room air  Abdomen: nondistended  Extremities: moving all extremities  Neuro: awake and conversant  Psych: appropriate mood and affect    Lab Review          Lab Results   Component Value Date    WBC 7.1 08/30/2024    HGB 14.2 08/30/2024    HCT 43.3 08/30/2024     (H) 08/30/2024     08/30/2024       Lab Results   Component Value Date    GLUCOSE 116 (H) 08/30/2024    CALCIUM 9.4 08/30/2024     08/30/2024    K 4.1 08/30/2024    CO2 28 08/30/2024    CL 98 08/30/2024    BUN 6 08/30/2024    CREATININE 0.75 08/30/2024         Assessment/Plan    Nia Ontiveros is a 58 y.o. 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    Plan to proceed with IFSLN mapping, possible lymphadenectomy, any other indicated procedures  Surgical consent was reviewed. The risks of surgery were discussed including: bleeding (including need for blood transfusion in life-threatening situations; risks of transfusion), infection, damage to surrounding organs. The patient had the opportunity to answer questions and desired to proceed with surgery following our discussion. Both verbal and written consents were obtained.    Pt to be seen and discussed with Dr. Quiles    ***  Gynecologic Oncology, pager [phone]

## 2024-11-07 ENCOUNTER — HOSPITAL ENCOUNTER (OUTPATIENT)
Dept: RADIOLOGY | Facility: CLINIC | Age: 59
Discharge: HOME | End: 2024-11-07
Payer: MEDICARE

## 2024-11-07 ENCOUNTER — ANESTHESIA EVENT (OUTPATIENT)
Dept: OPERATING ROOM | Facility: HOSPITAL | Age: 59
End: 2024-11-07
Payer: MEDICARE

## 2024-11-07 DIAGNOSIS — R93.89 ABNORMAL FINDING ON IMAGING: ICD-10-CM

## 2024-11-07 PROCEDURE — 74170 CT ABD WO CNTRST FLWD CNTRST: CPT | Performed by: RADIOLOGY

## 2024-11-07 PROCEDURE — 74170 CT ABD WO CNTRST FLWD CNTRST: CPT

## 2024-11-07 PROCEDURE — 2550000001 HC RX 255 CONTRASTS: Performed by: STUDENT IN AN ORGANIZED HEALTH CARE EDUCATION/TRAINING PROGRAM

## 2024-11-08 ENCOUNTER — ANESTHESIA (OUTPATIENT)
Dept: OPERATING ROOM | Facility: HOSPITAL | Age: 59
End: 2024-11-08
Payer: MEDICARE

## 2024-11-08 ENCOUNTER — HOSPITAL ENCOUNTER (OUTPATIENT)
Dept: RADIOLOGY | Facility: HOSPITAL | Age: 59
Setting detail: OUTPATIENT SURGERY
Discharge: HOME | End: 2024-11-08
Payer: MEDICARE

## 2024-11-08 ENCOUNTER — HOSPITAL ENCOUNTER (OUTPATIENT)
Facility: HOSPITAL | Age: 59
Setting detail: OUTPATIENT SURGERY
Discharge: HOME | End: 2024-11-08
Attending: STUDENT IN AN ORGANIZED HEALTH CARE EDUCATION/TRAINING PROGRAM | Admitting: STUDENT IN AN ORGANIZED HEALTH CARE EDUCATION/TRAINING PROGRAM
Payer: MEDICARE

## 2024-11-08 VITALS
DIASTOLIC BLOOD PRESSURE: 69 MMHG | HEIGHT: 65 IN | RESPIRATION RATE: 16 BRPM | OXYGEN SATURATION: 95 % | WEIGHT: 127.21 LBS | HEART RATE: 73 BPM | SYSTOLIC BLOOD PRESSURE: 128 MMHG | TEMPERATURE: 97.5 F | BODY MASS INDEX: 21.19 KG/M2

## 2024-11-08 DIAGNOSIS — C51.9 VULVAR CANCER (MULTI): ICD-10-CM

## 2024-11-08 DIAGNOSIS — O28.2 ASCUS (ATYPICAL SQUAMOUS CELLS OF UNDETERMINED SIGNIFICANCE) ON GYNECOLOGIC PAPANICOLAOU SMEAR COMPLICATING PREGNANCY, ANTEPARTUM: ICD-10-CM

## 2024-11-08 DIAGNOSIS — R87.619 ASCUS (ATYPICAL SQUAMOUS CELLS OF UNDETERMINED SIGNIFICANCE) ON GYNECOLOGIC PAPANICOLAOU SMEAR COMPLICATING PREGNANCY, ANTEPARTUM: ICD-10-CM

## 2024-11-08 DIAGNOSIS — D07.1 VIN III (VULVAR INTRAEPITHELIAL NEOPLASIA III): ICD-10-CM

## 2024-11-08 DIAGNOSIS — B97.7 HPV IN FEMALE: ICD-10-CM

## 2024-11-08 DIAGNOSIS — G89.18 POSTOPERATIVE PAIN: Primary | ICD-10-CM

## 2024-11-08 PROBLEM — J45.20 MILD INTERMITTENT ASTHMA WITHOUT COMPLICATION (HHS-HCC): Status: ACTIVE | Noted: 2024-11-08

## 2024-11-08 LAB
ABO GROUP (TYPE) IN BLOOD: NORMAL
ANTIBODY SCREEN: NORMAL
RH FACTOR (ANTIGEN D): NORMAL

## 2024-11-08 PROCEDURE — 7100000002 HC RECOVERY ROOM TIME - EACH INCREMENTAL 1 MINUTE: Performed by: STUDENT IN AN ORGANIZED HEALTH CARE EDUCATION/TRAINING PROGRAM

## 2024-11-08 PROCEDURE — 7100000001 HC RECOVERY ROOM TIME - INITIAL BASE CHARGE: Performed by: STUDENT IN AN ORGANIZED HEALTH CARE EDUCATION/TRAINING PROGRAM

## 2024-11-08 PROCEDURE — 86901 BLOOD TYPING SEROLOGIC RH(D): CPT

## 2024-11-08 PROCEDURE — 38900 IO MAP OF SENT LYMPH NODE: CPT | Performed by: STUDENT IN AN ORGANIZED HEALTH CARE EDUCATION/TRAINING PROGRAM

## 2024-11-08 PROCEDURE — 96372 THER/PROPH/DIAG INJ SC/IM: CPT | Performed by: STUDENT IN AN ORGANIZED HEALTH CARE EDUCATION/TRAINING PROGRAM

## 2024-11-08 PROCEDURE — 2500000004 HC RX 250 GENERAL PHARMACY W/ HCPCS (ALT 636 FOR OP/ED)

## 2024-11-08 PROCEDURE — 88309 TISSUE EXAM BY PATHOLOGIST: CPT | Mod: TC,SUR,WESLAB | Performed by: STUDENT IN AN ORGANIZED HEALTH CARE EDUCATION/TRAINING PROGRAM

## 2024-11-08 PROCEDURE — 3700000002 HC GENERAL ANESTHESIA TIME - EACH INCREMENTAL 1 MINUTE: Performed by: STUDENT IN AN ORGANIZED HEALTH CARE EDUCATION/TRAINING PROGRAM

## 2024-11-08 PROCEDURE — 88307 TISSUE EXAM BY PATHOLOGIST: CPT | Performed by: PATHOLOGY

## 2024-11-08 PROCEDURE — 36415 COLL VENOUS BLD VENIPUNCTURE: CPT

## 2024-11-08 PROCEDURE — 78830 RP LOCLZJ TUM SPECT W/CT 1: CPT

## 2024-11-08 PROCEDURE — 38570 LAPAROSCOPY LYMPH NODE BIOP: CPT | Performed by: STUDENT IN AN ORGANIZED HEALTH CARE EDUCATION/TRAINING PROGRAM

## 2024-11-08 PROCEDURE — 88309 TISSUE EXAM BY PATHOLOGIST: CPT | Performed by: PATHOLOGY

## 2024-11-08 PROCEDURE — A9520 TC99 TILMANOCEPT DIAG 0.5MCI: HCPCS | Performed by: STUDENT IN AN ORGANIZED HEALTH CARE EDUCATION/TRAINING PROGRAM

## 2024-11-08 PROCEDURE — 2500000001 HC RX 250 WO HCPCS SELF ADMINISTERED DRUGS (ALT 637 FOR MEDICARE OP)

## 2024-11-08 PROCEDURE — 3600000008 HC OR TIME - EACH INCREMENTAL 1 MINUTE - PROCEDURE LEVEL THREE: Performed by: STUDENT IN AN ORGANIZED HEALTH CARE EDUCATION/TRAINING PROGRAM

## 2024-11-08 PROCEDURE — 2500000004 HC RX 250 GENERAL PHARMACY W/ HCPCS (ALT 636 FOR OP/ED): Performed by: STUDENT IN AN ORGANIZED HEALTH CARE EDUCATION/TRAINING PROGRAM

## 2024-11-08 PROCEDURE — 3600000003 HC OR TIME - INITIAL BASE CHARGE - PROCEDURE LEVEL THREE: Performed by: STUDENT IN AN ORGANIZED HEALTH CARE EDUCATION/TRAINING PROGRAM

## 2024-11-08 PROCEDURE — 3700000001 HC GENERAL ANESTHESIA TIME - INITIAL BASE CHARGE: Performed by: STUDENT IN AN ORGANIZED HEALTH CARE EDUCATION/TRAINING PROGRAM

## 2024-11-08 PROCEDURE — 88307 TISSUE EXAM BY PATHOLOGIST: CPT | Mod: TC,SUR,WESLAB | Performed by: STUDENT IN AN ORGANIZED HEALTH CARE EDUCATION/TRAINING PROGRAM

## 2024-11-08 PROCEDURE — 7100000010 HC PHASE TWO TIME - EACH INCREMENTAL 1 MINUTE: Performed by: STUDENT IN AN ORGANIZED HEALTH CARE EDUCATION/TRAINING PROGRAM

## 2024-11-08 PROCEDURE — 3430000001 HC RX 343 DIAGNOSTIC RADIOPHARMACEUTICALS: Performed by: STUDENT IN AN ORGANIZED HEALTH CARE EDUCATION/TRAINING PROGRAM

## 2024-11-08 PROCEDURE — 96372 THER/PROPH/DIAG INJ SC/IM: CPT

## 2024-11-08 PROCEDURE — 7100000009 HC PHASE TWO TIME - INITIAL BASE CHARGE: Performed by: STUDENT IN AN ORGANIZED HEALTH CARE EDUCATION/TRAINING PROGRAM

## 2024-11-08 PROCEDURE — 2720000007 HC OR 272 NO HCPCS: Performed by: STUDENT IN AN ORGANIZED HEALTH CARE EDUCATION/TRAINING PROGRAM

## 2024-11-08 PROCEDURE — 56625 VULVECTOMY SIMPLE COMPLETE: CPT | Performed by: STUDENT IN AN ORGANIZED HEALTH CARE EDUCATION/TRAINING PROGRAM

## 2024-11-08 PROCEDURE — 2500000005 HC RX 250 GENERAL PHARMACY W/O HCPCS: Performed by: STUDENT IN AN ORGANIZED HEALTH CARE EDUCATION/TRAINING PROGRAM

## 2024-11-08 RX ORDER — MIDAZOLAM HYDROCHLORIDE 1 MG/ML
INJECTION INTRAMUSCULAR; INTRAVENOUS AS NEEDED
Status: DISCONTINUED | OUTPATIENT
Start: 2024-11-08 | End: 2024-11-08

## 2024-11-08 RX ORDER — INDOCYANINE GREEN AND WATER 25 MG
KIT INJECTION AS NEEDED
Status: DISCONTINUED | OUTPATIENT
Start: 2024-11-08 | End: 2024-11-08 | Stop reason: HOSPADM

## 2024-11-08 RX ORDER — CEFAZOLIN 1 G/1
INJECTION, POWDER, FOR SOLUTION INTRAVENOUS AS NEEDED
Status: DISCONTINUED | OUTPATIENT
Start: 2024-11-08 | End: 2024-11-08

## 2024-11-08 RX ORDER — ONDANSETRON HYDROCHLORIDE 2 MG/ML
INJECTION, SOLUTION INTRAVENOUS AS NEEDED
Status: DISCONTINUED | OUTPATIENT
Start: 2024-11-08 | End: 2024-11-08

## 2024-11-08 RX ORDER — ACETAMINOPHEN 325 MG/1
975 TABLET ORAL ONCE
Status: COMPLETED | OUTPATIENT
Start: 2024-11-08 | End: 2024-11-08

## 2024-11-08 RX ORDER — GLYCOPYRROLATE 0.2 MG/ML
INJECTION INTRAMUSCULAR; INTRAVENOUS AS NEEDED
Status: DISCONTINUED | OUTPATIENT
Start: 2024-11-08 | End: 2024-11-08

## 2024-11-08 RX ORDER — ACETAMINOPHEN 325 MG/1
650 TABLET ORAL EVERY 4 HOURS PRN
Status: DISCONTINUED | OUTPATIENT
Start: 2024-11-08 | End: 2024-11-08 | Stop reason: HOSPADM

## 2024-11-08 RX ORDER — PROPOFOL 10 MG/ML
INJECTION, EMULSION INTRAVENOUS AS NEEDED
Status: DISCONTINUED | OUTPATIENT
Start: 2024-11-08 | End: 2024-11-08

## 2024-11-08 RX ORDER — DOCUSATE SODIUM 100 MG/1
100 CAPSULE, LIQUID FILLED ORAL 2 TIMES DAILY PRN
Qty: 60 CAPSULE | Refills: 1 | Status: SHIPPED | OUTPATIENT
Start: 2024-11-08

## 2024-11-08 RX ORDER — IBUPROFEN 600 MG/1
600 TABLET ORAL EVERY 6 HOURS PRN
Qty: 30 TABLET | Refills: 1 | Status: SHIPPED | OUTPATIENT
Start: 2024-11-08

## 2024-11-08 RX ORDER — HYDROMORPHONE HYDROCHLORIDE 0.2 MG/ML
0.2 INJECTION INTRAMUSCULAR; INTRAVENOUS; SUBCUTANEOUS EVERY 5 MIN PRN
Status: DISCONTINUED | OUTPATIENT
Start: 2024-11-08 | End: 2024-11-08 | Stop reason: HOSPADM

## 2024-11-08 RX ORDER — OXYCODONE HYDROCHLORIDE 5 MG/1
10 TABLET ORAL EVERY 4 HOURS PRN
Status: DISCONTINUED | OUTPATIENT
Start: 2024-11-08 | End: 2024-11-08 | Stop reason: HOSPADM

## 2024-11-08 RX ORDER — LIDOCAINE HYDROCHLORIDE 20 MG/ML
INJECTION, SOLUTION INFILTRATION; PERINEURAL AS NEEDED
Status: DISCONTINUED | OUTPATIENT
Start: 2024-11-08 | End: 2024-11-08

## 2024-11-08 RX ORDER — HEPARIN SODIUM 5000 [USP'U]/ML
5000 INJECTION, SOLUTION INTRAVENOUS; SUBCUTANEOUS ONCE
Status: COMPLETED | OUTPATIENT
Start: 2024-11-08 | End: 2024-11-08

## 2024-11-08 RX ORDER — OXYCODONE HYDROCHLORIDE 5 MG/1
5 TABLET ORAL EVERY 6 HOURS PRN
Qty: 15 TABLET | Refills: 0 | Status: SHIPPED | OUTPATIENT
Start: 2024-11-08

## 2024-11-08 RX ORDER — BACITRACIN ZINC 500 UNIT/G
OINTMENT IN PACKET (EA) TOPICAL AS NEEDED
Status: DISCONTINUED | OUTPATIENT
Start: 2024-11-08 | End: 2024-11-08 | Stop reason: HOSPADM

## 2024-11-08 RX ORDER — ALBUTEROL SULFATE 0.83 MG/ML
2.5 SOLUTION RESPIRATORY (INHALATION) ONCE AS NEEDED
Status: DISCONTINUED | OUTPATIENT
Start: 2024-11-08 | End: 2024-11-08 | Stop reason: HOSPADM

## 2024-11-08 RX ORDER — CELECOXIB 200 MG/1
400 CAPSULE ORAL ONCE
Status: COMPLETED | OUTPATIENT
Start: 2024-11-08 | End: 2024-11-08

## 2024-11-08 RX ORDER — OXYCODONE HYDROCHLORIDE 5 MG/1
5 TABLET ORAL EVERY 4 HOURS PRN
Status: DISCONTINUED | OUTPATIENT
Start: 2024-11-08 | End: 2024-11-08 | Stop reason: HOSPADM

## 2024-11-08 RX ORDER — LIDOCAINE HYDROCHLORIDE AND EPINEPHRINE 10; 10 MG/ML; UG/ML
INJECTION, SOLUTION INFILTRATION; PERINEURAL AS NEEDED
Status: DISCONTINUED | OUTPATIENT
Start: 2024-11-08 | End: 2024-11-08 | Stop reason: HOSPADM

## 2024-11-08 RX ORDER — DROPERIDOL 2.5 MG/ML
0.62 INJECTION, SOLUTION INTRAMUSCULAR; INTRAVENOUS ONCE AS NEEDED
Status: DISCONTINUED | OUTPATIENT
Start: 2024-11-08 | End: 2024-11-08 | Stop reason: HOSPADM

## 2024-11-08 RX ORDER — ROCURONIUM BROMIDE 10 MG/ML
INJECTION, SOLUTION INTRAVENOUS AS NEEDED
Status: DISCONTINUED | OUTPATIENT
Start: 2024-11-08 | End: 2024-11-08

## 2024-11-08 RX ORDER — BACITRACIN ZINC 500 UNIT/G
OINTMENT (GRAM) TOPICAL 2 TIMES DAILY
Qty: 1 G | Refills: 1 | Status: SHIPPED | OUTPATIENT
Start: 2024-11-08

## 2024-11-08 RX ORDER — FENTANYL CITRATE 50 UG/ML
INJECTION, SOLUTION INTRAMUSCULAR; INTRAVENOUS AS NEEDED
Status: DISCONTINUED | OUTPATIENT
Start: 2024-11-08 | End: 2024-11-08

## 2024-11-08 RX ORDER — GABAPENTIN 600 MG/1
600 TABLET ORAL ONCE
Status: DISCONTINUED | OUTPATIENT
Start: 2024-11-08 | End: 2024-11-08 | Stop reason: HOSPADM

## 2024-11-08 RX ORDER — HYDROMORPHONE HYDROCHLORIDE 1 MG/ML
INJECTION, SOLUTION INTRAMUSCULAR; INTRAVENOUS; SUBCUTANEOUS AS NEEDED
Status: DISCONTINUED | OUTPATIENT
Start: 2024-11-08 | End: 2024-11-08

## 2024-11-08 RX ORDER — ONDANSETRON HYDROCHLORIDE 2 MG/ML
4 INJECTION, SOLUTION INTRAVENOUS ONCE AS NEEDED
Status: DISCONTINUED | OUTPATIENT
Start: 2024-11-08 | End: 2024-11-08 | Stop reason: HOSPADM

## 2024-11-08 ASSESSMENT — PAIN - FUNCTIONAL ASSESSMENT
PAIN_FUNCTIONAL_ASSESSMENT: 0-10

## 2024-11-08 ASSESSMENT — PAIN SCALES - GENERAL
PAINLEVEL_OUTOF10: 7
PAINLEVEL_OUTOF10: 2
PAINLEVEL_OUTOF10: 0 - NO PAIN
PAINLEVEL_OUTOF10: 1
PAINLEVEL_OUTOF10: 4
PAINLEVEL_OUTOF10: 0 - NO PAIN

## 2024-11-08 ASSESSMENT — PAIN DESCRIPTION - LOCATION
LOCATION: GROIN
LOCATION: GROIN

## 2024-11-08 ASSESSMENT — PAIN DESCRIPTION - ORIENTATION
ORIENTATION: RIGHT;LEFT
ORIENTATION: RIGHT;LEFT

## 2024-11-08 NOTE — ANESTHESIA PREPROCEDURE EVALUATION
Patient: Nia Ontiveros    Procedure Information       Anesthesia Start Date/Time: 11/08/24 1034    Procedures:       [place] inguinofemoral lymph node mapping and biopsy, possible lymphadenectomy, any other indicated procedures (Bilateral)      Wide local excision of the vulva    Location: [facility] OR 03 / Virtual [facility] OR    Surgeons: Dilcia Quiles MD            Relevant Problems   Anesthesia (within normal limits)      Pulmonary   (+) Mild intermittent asthma without complication (HHS-HCC)      ID   (+) HPV in female       Clinical information reviewed:   Tobacco  Allergies  Meds   Med Hx  Surg Hx   Fam Hx  Soc Hx        NPO Detail:  NPO/Void Status  Carbohydrate Drink Given Prior to Surgery? : N  Date of Last Liquid: 11/07/24  Time of Last Liquid: 0500 (sip of water and black coffee)  Date of Last Solid: 11/07/24  Time of Last Solid: 1300  Last Intake Type: Food         Physical Exam    Airway  Mallampati: I  TM distance: >3 FB  Neck ROM: full     Cardiovascular - normal exam     Dental - normal exam     Pulmonary - normal exam  Breath sounds clear to auscultation     Abdominal - normal exam             Anesthesia Plan    History of general anesthesia?: yes  History of complications of general anesthesia?: no    ASA 3     general     intravenous induction   Postoperative administration of opioids is intended.  Trial extubation is planned.  Anesthetic plan and risks discussed with patient.    Plan discussed with resident.

## 2024-11-08 NOTE — ANESTHESIA PROCEDURE NOTES
Airway  Date/Time: 11/8/2024 10:46 AM  Urgency: elective    Airway not difficult    Staffing  Performed: resident   Authorized by: Leodan Cage DO    Performed by: Elias Garcia MD  Patient location during procedure: OR    Indications and Patient Condition  Indications for airway management: anesthesia  Spontaneous Ventilation: absent  Sedation level: deep  Preoxygenated: yes  Patient position: sniffing  Mask difficulty assessment: 1 - vent by mask    Final Airway Details  Final airway type: endotracheal airway      Successful airway: ETT     Successful intubation technique: direct laryngoscopy  Blade: Keesha  Blade size: #3  ETT size (mm): 7.0  Cormack-Lehane Classification: grade IIa - partial view of glottis  Placement verified by: chest auscultation and capnometry   Measured from: lips  ETT to lips (cm): 21  Number of attempts at approach: 1

## 2024-11-08 NOTE — OP NOTE
[place] inguinofemoral lymph node mapping and biopsy, possible lymphadenectomy, any other indicated procedures (B), Wide local excision of the vulva Operative Note     Date: 2024  OR Location: [facility] OR    Name: Nia Ontiveros, : 1965, Age: 58 y.o., MRN: [id number], Sex: female    Diagnosis  Pre-op Diagnosis      * Vulvar cancer (Multi) [C51.9] Post-op Diagnosis     * Vulvar cancer (Multi) [C51.9]     Procedures  [place] inguinofemoral lymph node mapping and biopsy, possible lymphadenectomy, any other indicated procedures  13130 - IN OPEN BIOPSY/EXCISION INGUINOFEMORAL NODES    Wide local excision of the vulva  03970 - IN VULVECTOMY SIMPLE COMPLETE      Surgeons      * Dilcia Quiles - Primary    Resident/Fellow/Other Assistant:  Surgeons and Role:     * Ryann Aceves MD - Fellow     * Mariusz Henry MD - Fellow     * Honey Kent MD - Resident - Assisting    Staff:   Scrub Person: Ashley  Scrub Person: Sandy  Circulator: Lindsey  Relief Circulator: Janis  Relief Scrub: Jam    Anesthesia Staff: Anesthesiologist: Leodan Cage DO  C-AA: RAIMUNDO Carrillo  Anesthesia Resident: Elias Garcia MD    Procedure Summary  Anesthesia: Anesthesia type not filed in the log.  ASA: ASA status not filed in the log.  Estimated Blood Loss: 20 mL  Intra-op Medications:   Administrations occurring from 0955 to 1325 on 24:   Medication Name Total Dose   lidocaine-epinephrine (Xylocaine W/EPI) 1 %-1:100,000 injection 4 mL   indocyanine green (IC-Green) injection 5 mg   ceFAZolin (Ancef) vial 1 g 2 g   dexAMETHasone (Decadron) 4 mg/mL 8 mg   fentaNYL (Sublimaze) injection 50 mcg/mL 100 mcg   HYDROmorphone (Dilaudid) injection 1 mg/mL 0.5 mg   lidocaine (Xylocaine) injection 2 % 80 mg   midazolam PF (Versed) injection 1 mg/mL 2 mg   propofol (Diprivan) injection 10 mg/mL 180 mg   rocuronium (ZeMuron) 50 mg/5 mL injection 90 mg   acetaminophen (Tylenol) tablet 975 mg 975 mg   celecoxib (CeleBREX)  capsule 400 mg 400 mg   heparin (porcine) injection 5,000 Units 5,000 Units              Anesthesia Record               Intraprocedure I/O Totals       None           Specimen:   ID Type Source Tests Collected by Time   1 : RIGHT INGUINOFEMORAL SENTINEL LYMPH NODE Tissue INGUINAL LYMPH NODE DISSECTION RIGHT SURGICAL PATHOLOGY EXAM Dilcia Quiles MD 11/8/2024 1138   2 : LEFT INGUINOFEMORAL SENTINEL LYMPH NODE Tissue INGUINAL LYMPH NODE DISSECTION LEFT SURGICAL PATHOLOGY EXAM Dilcia Quiles MD 11/8/2024 1152   3 : WIDE LOCAL INCISION VULVA STITCH @ 12 Tissue VULVA WIDE LOCAL EXCISION SURGICAL PATHOLOGY EXAM Dilcia Quiles MD 11/8/2024 1223           Drains and/or Catheters: * None in log *    Findings: No enlarged or fixed inguinofemoral lymph nodes. Mapping of bilateral inguinofemoral sentinel lymph nodes on lymphoscintigram. Right sentinel lymph node mapped in OR with gamma probe and ICG dye. Left sentinel lymph node mapped in OR with ICG dye. Left labia minora with 1-2 cm area of warty like lesions with some hypo and hyper pigmentation, thin lacy lesion at 3 o'clock on left labia minora and thin lacy lesion at 6 o'clock.    Indications: Nia Ontiveros is an 58 y.o. female who is having surgery for Vulvar cancer (Multi) [C51.9].     The patient was seen in the preoperative area. The risks, benefits, complications, treatment options, non-operative alternatives, expected recovery and outcomes were discussed with the patient. The possibilities of reaction to medication, pulmonary aspiration, injury to surrounding structures, bleeding, recurrent infection, the need for additional procedures, failure to diagnose a condition, and creating a complication requiring transfusion or operation were discussed with the patient. The patient concurred with the proposed plan, giving informed consent.  The site of surgery was properly noted/marked if necessary per policy. The patient has been actively warmed in  preoperative area. Preoperative antibiotics are not indicated. Venous thrombosis prophylaxis have been ordered including bilateral sequential compression devices and chemical prophylaxis    Procedure Details:   The patient first was met in nuclear medicine. The scar was injected twice with LSG. Mapping to bilateral inguinofemoral nodes was confirmed by lymphoscintigram.    Consent was completed in pre-op holding area after discussing all risks/benefits/alternatives.   Team huddle and timeout were performed with all appropriate team members. General anesthesia was induced. Patient was placed in dorsal lithotomy position in vasquez stirrups and prepped and draped in the usual sterile fashion for vulva and groin procedures.     Attention was turned to the vulva. Indocyanine green was injected along the prior scar at 3 o'clock, 6 o'clock, 9 o'clock and 12 o'clock, with 1 cc within the dermis to develop a wheel underneath the skin. Attention was turned to the right groin after donning new sterile gloves. The anterior superior iliac spine and the pubic tubercle were marked with a marking pen on the right. A straight line was made connecting these and this was carried inferior 2 cm, parallel to the inguinal ligament. The femoral triangle was outlined. This was repeated on the left. The femoral arteries were palpated bilaterally. No firm/fixed nodes were palpable.      An incision was made on the marked line superficial to the inguinal ligament with the scalpel on the right. This was taken down to the level of the fascia with cautery. The femoral artery was again palpable.  Care was taken to evaluate medial to the femoral artery, in the region of expected lymph nodes. The gamma probe was used to localize the area of the sentinel lymph node. The Spy-phi was then turned onto ICG mode and a bright green lymph node was identified in the same region. We dissected this lymph node out with cautery and blunt dissection. The saphenous  vein and all of its branches were preserved. Once freed from all attachments, the node was viewed under the laparoscopic camera and confirmed to be the sentinel/bright green. We also confirmed the sentinel node using the gamma probe. A similar procedure was performed on the left. The sentinel lymph node on the left was confirmed to be green on the laparoscopic camera, but it did not read on the gamma probe. However, given the appearance, this was considered the sentinel lymph node. Both specimens were then sent to Pathology for permanent section.      The incisions were then closed in two layers with an interrupted suture of 2-0 vicryl layer.  The skin was closed with 4-0 monocryl. The incisions were cleaned, steri strips were applied, and island bandages were placed.    Attention was then turned to the vulva. Left posterior labial vulvar lesion was marked and excised in an ellipse shape with surrounding 1 centimeter of grossly uninvolved circumferential margins using a scalpel. Electrocautery was used to divide the specimen from underlying subcutaneous tissue. Total defect measured 2x2 cm. An additional lesion was resected at 6 o'clock measuring 1x2cm in size. This was excised in a similar fashion. Hemostasis was achieved with the use of electrocautery. We then proceeded with closure of the incision. Subcutaneous tissue was reapproximated using 2-0 Vicryl in an interrupted fashion. Skin was reapproximated with a 3-0 Vicryl in a running subcuticular fashion. Bacitracin ointment was applied to the vulvar incision at the end of the procedure. The patient was returned to the dorsal supine position.     All sponge, lap and needle counts were correct at the end of the procedure. Overall the patient tolerated procedure well and was taken to PACU in stable condition.      Complications:  None; patient tolerated the procedure well.    Disposition: PACU - hemodynamically stable.  Condition: stable       Attending  Attestation: I was present for the entirety of the procedure(s).     MD Dilcia Llanos  Phone Number: [phone]

## 2024-11-08 NOTE — H&P
Gynecologic Surgery History and Physical    Subjective   Nia Ontiveros is a 58 y.o. 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     PMH: asthma, cervical dysplasia   PSH: breast biopsy (benign)  Imaging:   PET MR 10/3:  PET:  1. Mild diffuse hypermetabolic activity involving the right vulva is  likely postprocedural in nature. No evidence of hypermetabolic local  residual or recurrent disease.  2. No hypermetabolic harris or distant metastatic disease.  3. Moderate diffuse hypermetabolic activity throughout the thyroid  gland compatible with thyroiditis.  4. A 3.9 cm left adrenal lesion does not demonstrate hypermetabolic  activity.      MRI:  1. Postsurgical changes of vulvectomy with no abnormal soft tissue  nodularity or enhancement in the surgical bed to suggest residual  viable neoplasm by MRI.  2. Indeterminate 3 cm left adrenal gland lesion. Further evaluation  with CT adrenal protocol is recommended.      Obstetrical History   OB History    Para Term  AB Living   2 2 2     4   SAB IAB Ectopic Multiple Live Births         2 4      # Outcome Date GA Lbr Demetrius/2nd Weight Sex Type Anes PTL Lv   2 Term            1 Term                Past Medical History  Past Medical History:   Diagnosis Date    BPPV (benign paroxysmal positional vertigo)         Past Surgical History   Past Surgical History:   Procedure Laterality Date    BREAST LUMPECTOMY  2014    Breast Surgery Lumpectomy    CERVIX LESION DESTRUCTION       SECTION, CLASSIC  2014     Section     SECTION, LOW TRANSVERSE      HERNIA REPAIR  2014    Hernia Repair    TUBAL LIGATION  2014    Tubal Ligation       Social History  Social History     Tobacco Use    Smoking status: Every Day     Types: Cigarettes     Passive exposure: Current    Smokeless tobacco: Never     Tobacco comments:     1ppd x 30yr   Substance Use Topics    Alcohol use: Yes     Substance and Sexual Activity   Drug Use Never       Allergies  Patient has no known allergies.     Medications  No medications prior to admission.       ROS: negative except per HPI    Objective    Last Vitals  There were no vitals taken for this visit.    Physical Examination  General: no acute distress  HEENT: normocephalic, atraumatic  Heart: warm and well perfused  Lungs: breathing comfortably on room air  Abdomen: nondistended  Extremities: moving all extremities  Neuro: awake and conversant  Psych: appropriate mood and affect    Lab Review          Lab Results   Component Value Date    WBC 7.1 08/30/2024    HGB 14.2 08/30/2024    HCT 43.3 08/30/2024     (H) 08/30/2024     08/30/2024       Lab Results   Component Value Date    GLUCOSE 116 (H) 08/30/2024    CALCIUM 9.4 08/30/2024     08/30/2024    K 4.1 08/30/2024    CO2 28 08/30/2024    CL 98 08/30/2024    BUN 6 08/30/2024    CREATININE 0.75 08/30/2024         Assessment/Plan    Nia Ontiveros is a 58 y.o. 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    Plan to proceed with IFSLN mapping, possible lymphadenectomy, any other indicated procedures  Surgical consent was reviewed. The risks of surgery were discussed including: bleeding (including need for blood transfusion in life-threatening situations; risks of transfusion), infection, damage to surrounding organs. The patient had the opportunity to answer questions and desired to proceed with surgery following our discussion. Both verbal and written consents were obtained.    Pt to be seen and discussed with Dr. Kb Chen MD  Gynecologic Oncology, pager [phone]

## 2024-11-08 NOTE — PERIOPERATIVE NURSING NOTE
1254 Pt arrived to PACU, alert and able to answer questions and follow commands. Pt denies pain at this time, shows no signs of distress. Will continue to monitor.     1415 Pt stable. Report given to discharge nurse.     1416 Pt moved to discharge with all personal belongings. Pt stable.       Mireya Phipps RN

## 2024-11-08 NOTE — DISCHARGE INSTRUCTIONS
Vulvar Care after vulvar surgery  1. AVOID TENSION ON THE SUTURES (stretching and pulling) as much as possible until your postoperative visit in the clinic.  This will mean limiting your activities during the healing process.  Get in and out of bed with care.  Avoid sitting as much as is feasible.  2. Perform sitz baths or rinse the area with a hand held shower device two to three times per day using lukewarm water.  Avoid soap and do not rub.  3. IMPORTANT - KEEP THE INCISIONS AS DRY AS POSSIBLE in between sitz baths.  After each sitz bath, blot the area with a towel (do not rub).  You may even use a warm (not hot) blow dryer to further dry the area.    4. Your vulvar/vaginal area will tend to be moist with a light drainage.  Excess moisture may increase risk for infection.  To reduce excess moisture buildup, keep a dry wash cloth between your legs.  Replace as necessary to keep dry. Take measures to keep the inguinal (groin) area dry as well.  5. Avoid constipation and straining with bowel movements.  You may take 100 mg of Colace twice daily as a stool softener (over the counter).  Stronger laxatives can be used as necessary.  Perform your sitz baths or shower rinses preferentially after bowel movements.  6. You can ice the area by applying an ice pack or frozen peas to the area. Do not ice for longer than 20 minutes  7.  If you have questions or experience fevers greater than 101 F degrees, foul-smelling drainage, excessive pain not relieved by prescribed medications, or have any questions after your discharge, call # [phone].  After hours your providers will be contacted via an answering service.

## 2024-11-08 NOTE — ANESTHESIA POSTPROCEDURE EVALUATION
Patient: Nia Ontiveros    Procedure Summary       Date: 11/08/24 Room / Location: [facility] OR 03 / [facility] OR    Anesthesia Start: 1034 Anesthesia Stop: 1258    Procedures:       [place] inguinofemoral lymph node mapping and biopsy (Bilateral)      Wide local excision of the vulva Diagnosis:       Vulvar cancer (Multi)      (Vulvar cancer (Multi) [C51.9])    Surgeons: Dilcia Quiles MD Responsible Provider: Leodan Cage DO    Anesthesia Type: general ASA Status: 3            Anesthesia Type: No value filed.    Vitals Value Taken Time   /82 11/08/24 1258   Temp 36.2 11/08/24 1258   Pulse 89 11/08/24 1255   Resp 7 11/08/24 1255   SpO2 100 % 11/08/24 1255   Vitals shown include unfiled device data.    Anesthesia Post Evaluation    Patient location during evaluation: PACU  Patient participation: complete - patient participated  Level of consciousness: awake and alert  Pain management: satisfactory to patient  Airway patency: patent  Cardiovascular status: blood pressure returned to baseline and acceptable  Respiratory status: acceptable  Hydration status: acceptable  Postoperative Nausea and Vomiting: none        There were no known notable events for this encounter.

## 2024-11-18 LAB
LABORATORY COMMENT REPORT: NORMAL
PATH REPORT.COMMENTS IMP SPEC: NORMAL
PATH REPORT.FINAL DX SPEC: NORMAL
PATH REPORT.GROSS SPEC: NORMAL
PATH REPORT.RELEVANT HX SPEC: NORMAL
PATH REPORT.TOTAL CANCER: NORMAL

## 2024-11-20 NOTE — PROGRESS NOTES
//Patient ID: Nia Ontiveros is a 58 y.o. female.  Referring Physician: No referring provider defined for this encounter.  Primary Care Provider: No Assigned PCP Generic Provider, MD    Subjective    HPI: Nia Ontiveros is a 58yF presenting for follow-up of HEAVEN III. She reports severe vulvar pruritis and pain since June that she describes as a burning, stabbing 10/10 pain that worsens when sitting. She has tried lidocaine and steroid cream that have slightly relieved the itching and pain. She previously took 600 mg advil daily that she stopped due to GI upset. She endorses other symptoms such as cough with phlegm, right rib pain, abdominal bloating, and LLE edema.     9/4/24 WLE vulva    11/8/24 [place] inguinofemoral lymph node mapping and biopsy, WLE of vulva    FINAL DIAGNOSIS   A. SENTINEL LYMPH NODE, RIGHT INGUINOFEMORAL:   -- LYMPH NODES (2), NO EVIDENCE OF TUMOR, 0/2     B. SENTINEL LYMPH NODE, PELVIC, LEFT INGUINOFEMORAL:   --LYMPH NODES (2), NO EVIDENCE OF TUMOR, 0/2     C. VULVA WIDE LOCAL EXCISION, STITCH AT 12:   --PORTION OF SQUAMOUS MUCOSA WITH HIGH-GRADE SQUAMOUS INTRAEPITHELIAL LESION (HEAVEN 3, USUAL TYPE) INVOLVING THE PERIPHERAL MARGIN OF RESECTION FROM 12:00 TO 6:00     D. VULVA WIDE LOCAL EXCISION AND VAGINAL MARGIN, STITCH AT 6:   -- PORTION OF SQUAMOUS MUCOSA WITH HIGH-GRADE SQUAMOUS INTRAEPITHELIAL LESION (HEAVEN 3, WARTY TYPE) FOCALLY INVOLVING THE MARGIN OF RESECTION AT 3:00 AND EXTENDING TO LESS THAN 1 MM FROM THE MARGIN OF RESECTION AT AT 10:00 TO 2:00         Interval History:  Patient called office concerned with development of a golf ball sized lump in left groin following surgery. Area is tender to touch. She reports generalized swelling across abdomen as well. Pain is well controlled with Tylenol. Reports regular bowel and bladder habits other than some burning after urination.     They deny fever, chills, constipation, diarrhea, vaginal bleeding, abdominal pain, nausea, vomiting, or  "any other symptoms other than those listed in the interval history.    PMH:  - Asthma controlled on albuterol inhalers PRN  - Vertigo   - Vulvar cancer    PSH:  - Breast biopsy, benign mass    OBHx:  The patient is a . Menarche was at 18. She underwent menopause at 36. She did not use HRT.    Social:  She endorses drinking 4-5 beers daily, smoking history of 1-1.5 packs for 30 years. No recreational drug use. The patient lives at home alone with her 2 dogs. 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     FamHx:  Great great grandmother with \"female\" cancer, unsure of the type of cancer. Grandfather with lung cancer. Other family history of T1D, dementia.     Their history is otherwise negative for a history of breast, ovarian, uterine, colon, pancreatic, and GI cancer.     Screening:  Cervical cancer: Pap smear 24 with ASCUS, positive HPV 16   Mammogram:  None  Colonoscopy: None      Review of Systems - Oncology     Objective   BSA: There is no height or weight on file to calculate BSA.  There were no vitals taken for this visit.     No family history on file.    Nia Ontiveros  reports that she has been smoking cigarettes. She has been exposed to tobacco smoke. She has never used smokeless tobacco.  She  reports current alcohol use.  She  reports no history of drug use.    Physical Exam  Genitourinary:     Comments: WLE site healing well, sutures intact, no evidence of infection. 4cm firm nodule located above left inguinal incision consistent with lymphocyst    Area prepped with Betadine, 1% Lidocaine used for anesthesia, 18 G needle inserted, 2ml serous fluid aspirated. Pressure used for hemostasis, patient tolerated procedure well.            Surgical Pathology Exam: [id number]  Order: [id number]   Collected 2024 13:10       Status: Final result       Visible to patient: Yes (seen)       Dx: HEAVEN III (vulvar intraepithelial neopl...    0 Result Notes      Component  "   FINAL DIAGNOSIS   A. LEFT VAGINAL WALL BIOPSY:   -- SQUAMOUS EPITHELIUM; NO PATHOLOGIC DIAGNOSIS     B. CERVIX AT 3 O'CLOCK BIOPSY:   -- FIBROUS STROMA; NO SQUAMOUS EPITHELIUM IDENTIFIED     C. ENDOCERVIX CURETTINGS:   -- DETACHED SQUAMOUS EPITHELIUM WITH LOW-GRADE SQUAMOUS INTRAEPITHELIAL LESION (SVETA-1); SCANT UNREMARKABLE ENDOCERVICAL EPITHELIUM     D. VULVA, WIDE LOCAL EXCISION:   -- HIGH-GRADE SQUAMOUS INTRAEPITHELIAL LESION (HEAVEN-3, USUAL TYPE), INVOLVING THE PERIPHERAL MARGINS OF RESECTION FROM 10:00 TO 6:00     E.  VULVA WITH CLITORIS LESION AT 12 O'CLOCK, WIDE LOCAL EXCISION:    -- INVASIVE SQUAMOUS CELL CARCINOMA, MODERATELY DIFFERENTIATED, INVOLVING THE RIGHT LABIA MINORA AT 8 TO 12:00 (6 MM MAXIMUM DEPTH OF INVASION)  --EXTENSIVE HIGH-GRADE SQUAMOUS INTRAEPITHELIAL LESION (HEAVEN 3, USUAL AND WARTY TYPES) INVOLVING THE ENTIRE PERIPHERAL AND PERIURETHRAL MARGIN OF RESECTION  --PERIPHERAL, PERIURETHRAL, AND DEEP MARGINS OF RESECTION ARE NEGATIVE FOR INVASIVE CARCINOMA   Electronically signed by Jet Melton MD on 9/27/2024 at 0739        By the signature on this report, the individual or group listed as making the Final Interpretation/Diagnosis certifies that they have reviewed this case.    Case Summary Report   VULVA   8th Edition - Protocol posted: 12/17/2021VULVA - All Specimens  SPECIMEN   Procedure  Wide excision   TUMOR   Tumor Focality  Unifocal   Tumor Site  Right vulva   Tumor Size  Greatest Dimension (Centimeters): 1.3 cm   Histologic Type  Squamous cell carcinoma, HPV-associated   Histologic Grade  G2, moderately differentiated   Depth of Tumor Invasion  6 mm   Tumor Border  Infiltrating   Other Tissue / Organ Involvement  Not applicable   Lymphovascular Invasion  Not identified   MARGINS   Margin Status for Invasive Carcinoma  All margins negative for invasive carcinoma   Closest Margin(s) to Invasive Carcinoma  Peripheral   Distance from Invasive Carcinoma to Closest Margin  6 mm mm    Margin Status for HSIL (VIN2-3) or dVIN  High-grade squamous intraepithelial lesion (HSIL) present at margin   Margin(s) Involved by HSIL  Peripheral: Entire margin of resection in specimen E, 10 to 6 o'clock in specimen D     REGIONAL LYMPH NODES   Regional Lymph Node Status  Not applicable (no regional lymph nodes submitted or found)   PATHOLOGIC STAGE CLASSIFICATION (pTNM, AJCC 8th ed.)   Reporting of pT, pN, and (when applicable) pM categories is based on information available to the pathologist at the time the report is issued. As per the AJCC (Chapter 1, 8th Ed.) it is the managing physician’s responsibility to establish the final pathologic stage based upon all pertinent information, including but potentially not limited to this pathology report.   pT Category  pT1b   pN Category  pN not assigned (cannot be determined based on available pathological information)   FIGO STAGE   FIGO Stage  IB   ADDITIONAL FINDINGS   Additional Findings  None identified           NM lymphoscintigram  Narrative: Interpreted By:  Cornelius Licea and Ogievich Taessa   STUDY:  NM LYMPHOSCINTIGRAM;  11/8/2024 9:18 am      INDICATION:  Signs/Symptoms:[place] lymph node mapping for vulvar cancer (gyn  oncology can inject).      ,C51.9 Malignant neoplasm of vulva, unspecified      COMPARISON:  None.      ACCESSION NUMBER(S):  [id number]      ORDERING CLINICIAN:  JOVANI MICHELLE      TECHNIQUE:  DIVISION OF NUCLEAR MEDICINE  RADIONUCLIDE SENTINEL LYMPH NODE LYMPHOSCINTIGRAPHY      A total of 0.6 millicuries of Tc-99m tilmanocept (Lymphoseek) was  injected intradermally in a circumferential pattern surrounding the  patient's vulva cancer. Immediate dynamic images were obtained  followed by multiple static images in multiple projections. SPECT/CT  images localized to the likely sentinel node region were also  acquired.      FINDINGS:  There is focal increased radiotracer activity within one right and  one left inguinal lymph node.  In addition there is increased  radiotracer activity in one right and one left external iliac lymph  nodes.      SPECT/CT images localize to the site of likely sentinel location were  obtained and localized images were sent to PACS.      Impression: Successful sentinel lymph node localization to the bilateral inguinal  nodes. Focal tracer activity within bilateral external iliac nodes  all likely related to downstream draining lymph nodes.          I personally reviewed the images/study and I agree with the findings  as stated by Evens Hwang DO, PGY-3. This study was interpreted  at [place], [place].      MACRO:  None      Signed by: Cornelius Licea 11/8/2024 10:11 AM  Dictation workstation:   [id number]       Oncology History Overview Note   9/4/24- WLE with at least stage Ib SCC of the vulva     Vulvar cancer (Multi)   10/3/2024 Initial Diagnosis    Vulvar cancer (Multi)           Assessment/Plan    58 y.o. 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 s/p WLE with negative margins for malignancy, but positive for HEAVEN in 9/2024, here for imaging review    #Vulvar cancer  - We discussed her pathology  - We discussed the pathophysiology of vulvar cancer, its relationship to cancer and frequent association with HPV   - We discussed treatment options going forward including my recommendation for imaging to evaluate the lymph nodes  - Reviewed PET/MRI which was negative for metastatic disease  - We also discussed plan for IFSLN mapping and biopsy     # Post op  - Pathology previously reviewed between patient and Dr. Quiles  - Recovering well  - Reviewed ongoing restrictions (no lifting more than 10-15lb, no soaking)  - Unable to aspirate fluid from lymphocyst. Will need US guidance. Scheduled for follow up tomorrow. Continue warm compresses.    # Burning with urination  - UA with reflex culture    # Cervical dysplasia  - We discussed the pathophysiology of cervical  dyplasia, its relationship to cancer and frequent association with HPV   - Hx of HPV exposure, pap smear 7/2024 with ASCUS and HPV 16 positive  - Colposcopy/ bxs with SVETA 1  - Pap in 6 months    Asmita Piña, ISSAC-CNP

## 2024-11-21 ENCOUNTER — OFFICE VISIT (OUTPATIENT)
Dept: GYNECOLOGIC ONCOLOGY | Facility: CLINIC | Age: 59
End: 2024-11-21
Payer: MEDICARE

## 2024-11-21 ENCOUNTER — LAB (OUTPATIENT)
Dept: LAB | Facility: LAB | Age: 59
End: 2024-11-21
Payer: MEDICARE

## 2024-11-21 VITALS
SYSTOLIC BLOOD PRESSURE: 144 MMHG | OXYGEN SATURATION: 100 % | TEMPERATURE: 98.4 F | WEIGHT: 127.8 LBS | BODY MASS INDEX: 21.27 KG/M2 | RESPIRATION RATE: 18 BRPM | DIASTOLIC BLOOD PRESSURE: 81 MMHG | HEART RATE: 64 BPM

## 2024-11-21 DIAGNOSIS — R30.0 DYSURIA: ICD-10-CM

## 2024-11-21 DIAGNOSIS — R93.89 ABNORMAL FINDING ON IMAGING: ICD-10-CM

## 2024-11-21 DIAGNOSIS — I89.8 INGUINAL LYMPHOCYST: Primary | ICD-10-CM

## 2024-11-21 LAB
APPEARANCE UR: CLEAR
BACTERIA #/AREA URNS AUTO: ABNORMAL /HPF
BILIRUB UR STRIP.AUTO-MCNC: NEGATIVE MG/DL
COLOR UR: ABNORMAL
GLUCOSE UR STRIP.AUTO-MCNC: NORMAL MG/DL
KETONES UR STRIP.AUTO-MCNC: NEGATIVE MG/DL
LEUKOCYTE ESTERASE UR QL STRIP.AUTO: ABNORMAL
MUCOUS THREADS #/AREA URNS AUTO: ABNORMAL /LPF
NITRITE UR QL STRIP.AUTO: ABNORMAL
PH UR STRIP.AUTO: 5 [PH]
PROT UR STRIP.AUTO-MCNC: NEGATIVE MG/DL
RBC # UR STRIP.AUTO: ABNORMAL /UL
RBC #/AREA URNS AUTO: ABNORMAL /HPF
SP GR UR STRIP.AUTO: 1
UROBILINOGEN UR STRIP.AUTO-MCNC: NORMAL MG/DL
WBC #/AREA URNS AUTO: ABNORMAL /HPF

## 2024-11-21 PROCEDURE — 87086 URINE CULTURE/COLONY COUNT: CPT

## 2024-11-21 PROCEDURE — 36415 COLL VENOUS BLD VENIPUNCTURE: CPT

## 2024-11-21 PROCEDURE — 87186 SC STD MICRODIL/AGAR DIL: CPT

## 2024-11-21 PROCEDURE — 99211 OFF/OP EST MAY X REQ PHY/QHP: CPT | Performed by: NURSE PRACTITIONER

## 2024-11-21 PROCEDURE — 84443 ASSAY THYROID STIM HORMONE: CPT

## 2024-11-21 PROCEDURE — 81001 URINALYSIS AUTO W/SCOPE: CPT

## 2024-11-21 PROCEDURE — 84439 ASSAY OF FREE THYROXINE: CPT

## 2024-11-21 ASSESSMENT — PAIN SCALES - GENERAL: PAINLEVEL_OUTOF10: 8

## 2024-11-22 ENCOUNTER — TELEPHONE (OUTPATIENT)
Dept: GYNECOLOGIC ONCOLOGY | Facility: HOSPITAL | Age: 59
End: 2024-11-22
Payer: MEDICARE

## 2024-11-22 ENCOUNTER — OFFICE VISIT (OUTPATIENT)
Dept: GYNECOLOGIC ONCOLOGY | Facility: HOSPITAL | Age: 59
End: 2024-11-22
Payer: MEDICARE

## 2024-11-22 VITALS
SYSTOLIC BLOOD PRESSURE: 144 MMHG | DIASTOLIC BLOOD PRESSURE: 72 MMHG | BODY MASS INDEX: 20.94 KG/M2 | TEMPERATURE: 98.1 F | RESPIRATION RATE: 18 BRPM | HEART RATE: 83 BPM | HEIGHT: 65 IN | OXYGEN SATURATION: 97 % | WEIGHT: 125.66 LBS

## 2024-11-22 DIAGNOSIS — I89.8 INGUINAL LYMPHOCYST: Primary | ICD-10-CM

## 2024-11-22 DIAGNOSIS — N30.00 ACUTE CYSTITIS WITHOUT HEMATURIA: Primary | ICD-10-CM

## 2024-11-22 LAB
HOLD SPECIMEN: NORMAL
T4 FREE SERPL-MCNC: 1.17 NG/DL (ref 0.78–1.48)
TSH SERPL-ACNC: 6.68 MIU/L (ref 0.44–3.98)

## 2024-11-22 PROCEDURE — 3008F BODY MASS INDEX DOCD: CPT | Performed by: OBSTETRICS & GYNECOLOGY

## 2024-11-22 PROCEDURE — 99214 OFFICE O/P EST MOD 30 MIN: CPT | Performed by: OBSTETRICS & GYNECOLOGY

## 2024-11-22 PROCEDURE — 87070 CULTURE OTHR SPECIMN AEROBIC: CPT

## 2024-11-22 RX ORDER — SULFAMETHOXAZOLE AND TRIMETHOPRIM 800; 160 MG/1; MG/1
1 TABLET ORAL 2 TIMES DAILY
Qty: 14 TABLET | Refills: 0 | Status: SHIPPED | OUTPATIENT
Start: 2024-11-22 | End: 2024-11-29

## 2024-11-22 ASSESSMENT — PAIN SCALES - GENERAL: PAINLEVEL_OUTOF10: 0-NO PAIN

## 2024-11-22 NOTE — PROGRESS NOTES
Patient ID: Nia Ontiveros is a 58 y.o. female.  Referring Physician: No referring provider defined for this encounter.  Primary Care Provider: No Assigned PCP Generic Provider, MD     Patient ID: Nia Ontiveros is a 58 y.o. female.    General    Date/Time: 11/22/2024 5:04 PM    Performed by: Austin Garcia MD  Authorized by: Jina Tabor MD    Consent:     Consent obtained:  Verbal    Consent given by:  Patient    Risks, benefits, and alternatives were discussed: yes      Risks discussed:  Incomplete drainage, infection, pain and bleeding    Alternatives discussed:  Alternative treatment and observation  Indications:     Indications:  Lymphocyst, pain  Pre-procedure details:     Skin preparation:  Chlorhexidine    Preparation: Patient was prepped and draped in the usual sterile fashion    Sedation:     Sedation type:  None  Anesthesia:     Anesthesia method:  Local infiltration    Local anesthetic:  Lidocaine 1% w/o epi  Procedure specific details:      Under ultrasound guidance, fluid collection was drained of 10cc straw colored thin fluid with reduction in size of fluid collection.  Dressing placed.  Post-procedure details:     Procedure completion:  Tolerated well, no immediate complications    Culture taken  Will have team follow up next week    Austin Garcia MD  Performed with Dr. Tabor and Dr. Aceves

## 2024-11-22 NOTE — TELEPHONE ENCOUNTER
I spoke with Nia today and told her that her UA looked suspicious of infection. I told her that her physician sent in antibiotics for her and that we were waiting for the culture to process. She verbalized her understanding and did not have any questions at this time.

## 2024-11-24 LAB
BACTERIA SPEC CULT: NORMAL
BACTERIA UR CULT: ABNORMAL
GRAM STN SPEC: NORMAL
GRAM STN SPEC: NORMAL

## 2024-11-25 ENCOUNTER — TELEPHONE (OUTPATIENT)
Dept: GYNECOLOGIC ONCOLOGY | Facility: HOSPITAL | Age: 59
End: 2024-11-25
Payer: MEDICARE

## 2024-11-25 LAB
BACTERIA SPEC CULT: NORMAL
GRAM STN SPEC: NORMAL
GRAM STN SPEC: NORMAL

## 2024-11-25 NOTE — TELEPHONE ENCOUNTER
Phoned patient in follow up to [employer] message sent with antibiotic question and concern that lymphocyst that was drained in the office on 11/22 is infected.   Message left on patient voice mail requesting a return phone call.   [employer] message sent to patient notifying her that Bactrim is listed on recent urine culture result sensitivity.    Messaged patient requesting she return nurse call.

## 2024-11-26 DIAGNOSIS — I89.8 INGUINAL LYMPHOCYST: Primary | ICD-10-CM

## 2024-11-27 ENCOUNTER — HOSPITAL ENCOUNTER (EMERGENCY)
Facility: HOSPITAL | Age: 59
Discharge: HOME | End: 2024-11-27
Attending: EMERGENCY MEDICINE
Payer: MEDICARE

## 2024-11-27 ENCOUNTER — APPOINTMENT (OUTPATIENT)
Dept: RADIOLOGY | Facility: HOSPITAL | Age: 59
End: 2024-11-27
Payer: MEDICARE

## 2024-11-27 VITALS
BODY MASS INDEX: 19.99 KG/M2 | HEART RATE: 55 BPM | DIASTOLIC BLOOD PRESSURE: 81 MMHG | RESPIRATION RATE: 16 BRPM | HEIGHT: 65 IN | WEIGHT: 120 LBS | TEMPERATURE: 98.2 F | SYSTOLIC BLOOD PRESSURE: 148 MMHG | OXYGEN SATURATION: 99 %

## 2024-11-27 DIAGNOSIS — R59.1 LYMPHADENOPATHY: Primary | ICD-10-CM

## 2024-11-27 LAB
ALBUMIN SERPL BCP-MCNC: 4.3 G/DL (ref 3.4–5)
ALP SERPL-CCNC: 74 U/L (ref 33–110)
ALT SERPL W P-5'-P-CCNC: 16 U/L (ref 7–45)
ANION GAP SERPL CALC-SCNC: 13 MMOL/L (ref 10–20)
APPEARANCE UR: CLEAR
AST SERPL W P-5'-P-CCNC: 32 U/L (ref 9–39)
BASOPHILS # BLD AUTO: 0.07 X10*3/UL (ref 0–0.1)
BASOPHILS NFR BLD AUTO: 1.1 %
BILIRUB SERPL-MCNC: 0.6 MG/DL (ref 0–1.2)
BILIRUB UR STRIP.AUTO-MCNC: NEGATIVE MG/DL
BUN SERPL-MCNC: 6 MG/DL (ref 6–23)
CALCIUM SERPL-MCNC: 9.8 MG/DL (ref 8.6–10.3)
CHLORIDE SERPL-SCNC: 100 MMOL/L (ref 98–107)
CO2 SERPL-SCNC: 26 MMOL/L (ref 21–32)
COLOR UR: ABNORMAL
CREAT SERPL-MCNC: 0.86 MG/DL (ref 0.5–1.05)
EGFRCR SERPLBLD CKD-EPI 2021: 79 ML/MIN/1.73M*2
EOSINOPHIL # BLD AUTO: 0.11 X10*3/UL (ref 0–0.7)
EOSINOPHIL NFR BLD AUTO: 1.7 %
ERYTHROCYTE [DISTWIDTH] IN BLOOD BY AUTOMATED COUNT: 12.4 % (ref 11.5–14.5)
GLUCOSE SERPL-MCNC: 89 MG/DL (ref 74–99)
GLUCOSE UR STRIP.AUTO-MCNC: NORMAL MG/DL
HCT VFR BLD AUTO: 41.1 % (ref 36–46)
HGB BLD-MCNC: 13.9 G/DL (ref 12–16)
IMM GRANULOCYTES # BLD AUTO: 0.03 X10*3/UL (ref 0–0.7)
IMM GRANULOCYTES NFR BLD AUTO: 0.5 % (ref 0–0.9)
KETONES UR STRIP.AUTO-MCNC: NEGATIVE MG/DL
LACTATE SERPL-SCNC: 1.5 MMOL/L (ref 0.4–2)
LEUKOCYTE ESTERASE UR QL STRIP.AUTO: ABNORMAL
LYMPHOCYTES # BLD AUTO: 1.39 X10*3/UL (ref 1.2–4.8)
LYMPHOCYTES NFR BLD AUTO: 21.3 %
MCH RBC QN AUTO: 33.7 PG (ref 26–34)
MCHC RBC AUTO-ENTMCNC: 33.8 G/DL (ref 32–36)
MCV RBC AUTO: 100 FL (ref 80–100)
MONOCYTES # BLD AUTO: 0.54 X10*3/UL (ref 0.1–1)
MONOCYTES NFR BLD AUTO: 8.3 %
MUCOUS THREADS #/AREA URNS AUTO: NORMAL /LPF
NEUTROPHILS # BLD AUTO: 4.4 X10*3/UL (ref 1.2–7.7)
NEUTROPHILS NFR BLD AUTO: 67.1 %
NITRITE UR QL STRIP.AUTO: NEGATIVE
NRBC BLD-RTO: 0 /100 WBCS (ref 0–0)
PH UR STRIP.AUTO: 5 [PH]
PLATELET # BLD AUTO: 217 X10*3/UL (ref 150–450)
POTASSIUM SERPL-SCNC: 4.1 MMOL/L (ref 3.5–5.3)
PROT SERPL-MCNC: 7 G/DL (ref 6.4–8.2)
PROT UR STRIP.AUTO-MCNC: NEGATIVE MG/DL
RBC # BLD AUTO: 4.13 X10*6/UL (ref 4–5.2)
RBC # UR STRIP.AUTO: NEGATIVE /UL
RBC #/AREA URNS AUTO: NORMAL /HPF
SODIUM SERPL-SCNC: 135 MMOL/L (ref 136–145)
SP GR UR STRIP.AUTO: 1
UROBILINOGEN UR STRIP.AUTO-MCNC: NORMAL MG/DL
WBC # BLD AUTO: 6.5 X10*3/UL (ref 4.4–11.3)
WBC #/AREA URNS AUTO: NORMAL /HPF

## 2024-11-27 PROCEDURE — 84075 ASSAY ALKALINE PHOSPHATASE: CPT | Performed by: EMERGENCY MEDICINE

## 2024-11-27 PROCEDURE — C1769 GUIDE WIRE: HCPCS

## 2024-11-27 PROCEDURE — 76942 ECHO GUIDE FOR BIOPSY: CPT

## 2024-11-27 PROCEDURE — 85025 COMPLETE CBC W/AUTO DIFF WBC: CPT | Performed by: EMERGENCY MEDICINE

## 2024-11-27 PROCEDURE — 83605 ASSAY OF LACTIC ACID: CPT | Performed by: EMERGENCY MEDICINE

## 2024-11-27 PROCEDURE — 74177 CT ABD & PELVIS W/CONTRAST: CPT

## 2024-11-27 PROCEDURE — 99285 EMERGENCY DEPT VISIT HI MDM: CPT | Mod: 25

## 2024-11-27 PROCEDURE — 36415 COLL VENOUS BLD VENIPUNCTURE: CPT | Performed by: EMERGENCY MEDICINE

## 2024-11-27 PROCEDURE — 87086 URINE CULTURE/COLONY COUNT: CPT | Mod: AHULAB | Performed by: EMERGENCY MEDICINE

## 2024-11-27 PROCEDURE — C1729 CATH, DRAINAGE: HCPCS

## 2024-11-27 PROCEDURE — 2500000004 HC RX 250 GENERAL PHARMACY W/ HCPCS (ALT 636 FOR OP/ED): Performed by: RADIOLOGY

## 2024-11-27 PROCEDURE — 81001 URINALYSIS AUTO W/SCOPE: CPT | Performed by: EMERGENCY MEDICINE

## 2024-11-27 PROCEDURE — 2550000001 HC RX 255 CONTRASTS: Performed by: EMERGENCY MEDICINE

## 2024-11-27 PROCEDURE — 74177 CT ABD & PELVIS W/CONTRAST: CPT | Mod: FOREIGN READ | Performed by: RADIOLOGY

## 2024-11-27 PROCEDURE — 2720000007 HC OR 272 NO HCPCS

## 2024-11-27 RX ORDER — LIDOCAINE HYDROCHLORIDE 10 MG/ML
INJECTION, SOLUTION EPIDURAL; INFILTRATION; INTRACAUDAL; PERINEURAL
Status: COMPLETED | OUTPATIENT
Start: 2024-11-27 | End: 2024-11-27

## 2024-11-27 RX ORDER — CEPHALEXIN 500 MG/1
500 CAPSULE ORAL 4 TIMES DAILY
Qty: 28 CAPSULE | Refills: 0 | Status: SHIPPED | OUTPATIENT
Start: 2024-11-27 | End: 2024-12-04

## 2024-11-27 ASSESSMENT — PAIN SCALES - GENERAL
PAINLEVEL_OUTOF10: 7
PAINLEVEL_OUTOF10: 0 - NO PAIN
PAINLEVEL_OUTOF10: 6

## 2024-11-27 ASSESSMENT — PAIN DESCRIPTION - PAIN TYPE: TYPE: SURGICAL PAIN

## 2024-11-27 ASSESSMENT — PAIN DESCRIPTION - ORIENTATION: ORIENTATION: LEFT

## 2024-11-27 ASSESSMENT — PAIN DESCRIPTION - FREQUENCY: FREQUENCY: CONSTANT/CONTINUOUS

## 2024-11-27 ASSESSMENT — PAIN DESCRIPTION - PROGRESSION: CLINICAL_PROGRESSION: NOT CHANGED

## 2024-11-27 ASSESSMENT — PAIN DESCRIPTION - ONSET: ONSET: GRADUAL

## 2024-11-27 ASSESSMENT — COLUMBIA-SUICIDE SEVERITY RATING SCALE - C-SSRS
1. IN THE PAST MONTH, HAVE YOU WISHED YOU WERE DEAD OR WISHED YOU COULD GO TO SLEEP AND NOT WAKE UP?: NO
6. HAVE YOU EVER DONE ANYTHING, STARTED TO DO ANYTHING, OR PREPARED TO DO ANYTHING TO END YOUR LIFE?: NO
2. HAVE YOU ACTUALLY HAD ANY THOUGHTS OF KILLING YOURSELF?: NO

## 2024-11-27 ASSESSMENT — PAIN DESCRIPTION - LOCATION: LOCATION: INCISION

## 2024-11-27 ASSESSMENT — PAIN DESCRIPTION - DESCRIPTORS: DESCRIPTORS: ACHING;THROBBING

## 2024-11-27 ASSESSMENT — PAIN - FUNCTIONAL ASSESSMENT: PAIN_FUNCTIONAL_ASSESSMENT: 0-10

## 2024-11-27 NOTE — ED PROVIDER NOTES
HPI   Chief Complaint   Patient presents with    Abscess    Post-op Problem       HPI  Patient is a 58-year-old female with a past medical history significant for valvular dystrophy, vulvar intraepithelial neoplasia stage III, HPV, ASCUS, vulvar cancer who presents emergency room with a chief complaint of fluid collection in the inguinal lymph nodes.  Patient has a history of this occurring and had a last drain of the left inguinal lymph node last week.  She states that since then it has reaccumulated.  She has a drain placement scheduled for 5 December but she feels uncomfortable and does not want to wait.  She is also worried that she might be getting septic.  She thinks that she also still has an ongoing UTI but denies any dysuria, hematuria.  She does feel some suprapubic discomfort as well.  She denies any fevers, chills, chest pain, shortness of breath or other symptoms at this time.  Her pain is controlled that she took a pill prior to coming in.  Of note, patient states she recently concluded a course of Bactrim due to concern for infection at the inguinal lymph node site as well as UTI.      PMHx: As above  PSHx: As above and per EMR  FamilyHx: Denies pertinent  SocialHx: Smokes daily, alcohol use on occasion  Allergies: NKDA  Medications: See Medication Reconciliation     ROS  As above otherwise denies      Physical Exam    GENERAL: Awake and Alert, No Acute Distress  HEENT: AT/NC, PERRL, EOMI, Normal Oropharynx, No Signs of Dehydration  NECK: Normal Inspection, No JVD  CARDIOVASCULAR: RRR, No M/R/G  RESPIRATORY: CTA Bilaterally, No Wheezes, Rales or Rhonchi, Chest Wall Non-tender  ABDOMEN: Patient has a fluid collection to the left inguinal lymph node that shows no erythema, purulence.  There is a drainage site that appears without signs of infection.  There is larger than the right side which is a very small amount of fluid collection again without any infectious symptoms.  No suprapubic tenderness.   Otherwise soft, non-tender abdomen, Normal Bowel Sounds, No Distention  BACK: No CVA Tenderness  SKIN: Normal Color, Warm, Dry, No Rashes   EXTREMITIES: Non-Tender, Full ROM, No Pedal Edema  NEURO: A&O x 3, Normal Motor and Sensation, Normal Mood and Affect    Nursing Assessment and Vitals Reviewed    Medical Decision  Patient is a 58-year-old female with a past medical history significant for valvular dystrophy, vulvar intraepithelial neoplasia stage III, HPV, ASCUS, vulvar cancer who presents emergency room with a chief complaint of fluid collection in the inguinal lymph nodes.  Patient has a history of this occurring and had a last drain of the left inguinal lymph node last week.  She states that since then it has reaccumulated.  She has a drain placement scheduled for 5 December but she feels uncomfortable and does not want to wait.  She is also worried that she might be getting septic.  She thinks that she also still has an ongoing UTI but denies any dysuria, hematuria.  She does feel some suprapubic discomfort as well.  She denies any fevers, chills, chest pain, shortness of breath or other symptoms at this time.  Her pain is controlled that she took a pill prior to coming in.  Of note, patient states she recently concluded a course of Bactrim due to concern for infection at the inguinal lymph node site as well as UTI.    On evaluation patient is extremely well-appearing and in no acute distress.  Lungs are clear and heart is regular.  Patient has a fluid collection to the left inguinal lymph node that shows no erythema, purulence.  There is a drainage site that appears without signs of infection.  There is larger than the right side which is a very small amount of fluid collection again without any infectious symptoms.  No suprapubic tenderness.  Otherwise soft, non-tender abdomen.  No suspicion for sepsis at this time.  Given complaints, risk factors we will perform a workup.  I did contact IR to see if they  could potentially drain patient lymph node which is what she feels will make her most comfortable.  They are requesting additional imaging which is obtained.  In the meantime have contacted Dr. Quiles who agrees with plan of care.  She already has a plan of care in place for patient next week but states that if it is able to be accomplished here at [facility] she is in favor of doing it as soon as possible as she feels that this is what will make the patient feel most comfortable.  CT imaging showed irregular thickening of a loop of small bowel in the left lower quadrant and the known lymphadenopathy changes as well as an adrenal nodule.  Patient has recently undergone a PET scan that did not show any additional signs of neoplasm in the bowels.  Per my discussion with Dr. Quiles this is likely secondary to inflammation but they will follow her closely.  Patient was successfully taken to IR here at [facility] and a drain was placed.  She is started on Keflex as outpatient as her recent urinalysis showed sensitivity to this although her urinalysis shows much improvement.  She is feeling very well and happy to go home at this time.  She is discharged in stable condition with education on supportive care at home as well as signs and symptoms that should prompt immediate turn to emergency room.                Patient History   Past Medical History:   Diagnosis Date    BPPV (benign paroxysmal positional vertigo)     Vulva cancer (Multi)      Past Surgical History:   Procedure Laterality Date    BREAST LUMPECTOMY  2014    Breast Surgery Lumpectomy    CERVIX LESION DESTRUCTION       SECTION, CLASSIC  2014     Section     SECTION, LOW TRANSVERSE      HERNIA REPAIR  2014    Hernia Repair    TUBAL LIGATION  2014    Tubal Ligation     No family history on file.  Social History     Tobacco Use    Smoking status: Every Day     Types: Cigarettes     Passive exposure: Current    Smokeless  tobacco: Never    Tobacco comments:     1ppd x 30yr   Vaping Use    Vaping status: Never Used   Substance Use Topics    Alcohol use: Yes     Comment: 3-5 beers daily    Drug use: Never       Physical Exam   ED Triage Vitals [11/27/24 0848]   Temperature Heart Rate Respirations BP   36.1 °C (97 °F) 99 18 176/78      Pulse Ox Temp Source Heart Rate Source Patient Position   98 % Temporal Monitor Sitting      BP Location FiO2 (%)     Right arm --       Physical Exam      ED Course & MDM   Diagnoses as of 11/27/24 1717   Lymphadenopathy                 No data recorded     Jayson Coma Scale Score: 15 (11/27/24 0851 : Vivien Lowe, HILLARY)                           Medical Decision Making      Procedure  Procedures     Viky Lee MD  11/27/24 9201

## 2024-11-27 NOTE — DISCHARGE INSTRUCTIONS
Take your medications as indicated for length of time instructed.  Follow-up with Dr. Quiles.  Return immediately if concerning symptoms, as discussed.  Below are the results of your CT images to discuss with your doctor.     IMPRESSION:  *Irregular wall thickening of a loop of small bowel in the left  lower quadrant measures 2.7 x 2.7 x 2.5 cm. Abnormal left groin lymph  nodes measure up to 3.5 x 3.7 cm. These findings are concerning for  malignancy.  *Nonspecific 1.2 cm left adrenal nodule.

## 2024-11-27 NOTE — POST-PROCEDURE NOTE
Interventional Radiology Brief Postprocedure Note    Attending: Ricco Stovlal MD      Assistant: none    Diagnosis: Left groin lymphocele    Description of procedure: Ultrasound guided 8 F drain placement to bulb suction. Complex lymphocele may limit complete drainage. Clear yellow fluid.     Anesthesia:  Local    Complications: None    Estimated Blood Loss: none    Medications  As of 11/27/24 1550      iohexol (OMNIPaque) 350 mg iodine/mL solution 75 mL (mL) Total volume:  75 mL Dosing weight:  54.4      Date/Time Rate/Dose/Volume Action       11/27/24  1318 75 mL Given               lidocaine PF (Xylocaine) 10 mg/mL (1 %) injection (mL) Total volume:  10 mL      Date/Time Rate/Dose/Volume Action       11/27/24  1534 10 mL Given                   No specimens collected      See detailed result report with images in PACS.    The patient tolerated the procedure well without incident or complication and is in stable condition.

## 2024-11-27 NOTE — ED TRIAGE NOTES
Patient presents to ED from home for left side abscess on her abdomen that she has had drained twice. Patient states it is now infected and she doesn't feel well. Patient also complains of a UTI that is hospital acquired and is not getting better.

## 2024-11-29 LAB — BACTERIA UR CULT: NO GROWTH

## 2024-12-05 ENCOUNTER — HOSPITAL ENCOUNTER (OUTPATIENT)
Dept: RADIOLOGY | Facility: HOSPITAL | Age: 59
Discharge: HOME | End: 2024-12-05
Payer: MEDICARE

## 2024-12-05 VITALS
OXYGEN SATURATION: 98 % | TEMPERATURE: 98.2 F | DIASTOLIC BLOOD PRESSURE: 83 MMHG | SYSTOLIC BLOOD PRESSURE: 147 MMHG | HEART RATE: 68 BPM | RESPIRATION RATE: 16 BRPM

## 2024-12-05 DIAGNOSIS — I89.8 INGUINAL LYMPHOCYST: ICD-10-CM

## 2024-12-05 PROCEDURE — 2500000004 HC RX 250 GENERAL PHARMACY W/ HCPCS (ALT 636 FOR OP/ED): Performed by: RADIOLOGY

## 2024-12-05 PROCEDURE — 7100000009 HC PHASE TWO TIME - INITIAL BASE CHARGE

## 2024-12-05 PROCEDURE — 7100000010 HC PHASE TWO TIME - EACH INCREMENTAL 1 MINUTE

## 2024-12-05 PROCEDURE — 2500000004 HC RX 250 GENERAL PHARMACY W/ HCPCS (ALT 636 FOR OP/ED): Performed by: STUDENT IN AN ORGANIZED HEALTH CARE EDUCATION/TRAINING PROGRAM

## 2024-12-05 PROCEDURE — 49185 SCLEROTX FLUID COLLECTION: CPT

## 2024-12-05 RX ORDER — FENTANYL CITRATE 50 UG/ML
INJECTION, SOLUTION INTRAMUSCULAR; INTRAVENOUS
Status: COMPLETED | OUTPATIENT
Start: 2024-12-05 | End: 2024-12-05

## 2024-12-05 ASSESSMENT — PAIN - FUNCTIONAL ASSESSMENT
PAIN_FUNCTIONAL_ASSESSMENT: 0-10

## 2024-12-05 ASSESSMENT — PAIN SCALES - GENERAL
PAINLEVEL_OUTOF10: 8
PAINLEVEL_OUTOF10: 0 - NO PAIN
PAINLEVEL_OUTOF10: 5 - MODERATE PAIN
PAINLEVEL_OUTOF10: 10 - WORST POSSIBLE PAIN
PAINLEVEL_OUTOF10: 9
PAINLEVEL_OUTOF10: 8
PAINLEVEL_OUTOF10: 4
PAINLEVEL_OUTOF10: 8
PAINLEVEL_OUTOF10: 5 - MODERATE PAIN
PAINLEVEL_OUTOF10: 0 - NO PAIN
PAINLEVEL_OUTOF10: 0 - NO PAIN

## 2024-12-05 NOTE — POST-PROCEDURE NOTE
Interventional Radiology Brief Postprocedure Note    Attending: Dr. Teresita Vivar    Assistant: Dr. Andres    Diagnosis: Left Inguinal Fluid Collection     Description of procedure: Patient was brought to the procedure area.  Limited diagnostic scanning of the left inguinal region was performed, which demonstrated a small fluid collection with a drainage catheter in place. Subsequently the patient was prepped and draped in the usual sterile manner.  Lidocaine was injected for local anesthesia.  Subsequently approximately 8 ml of doxycycline mixed with sterile water was injected through the catheter into the collection. After approximately 30 minutes, this fluid was removed through the drainage catheter. Patient tolerated the procedure. See PACS for full details.       Anesthesia:  Local    Complications: None    Estimated Blood Loss: minimal    Medications (Filter: Administrations occurring from 1248 to 1318 on 12/05/24) As of 12/05/24 1318      fentaNYL PF (Sublimaze) injection (mcg) Total dose:  100 mcg      Date/Time Rate/Dose/Volume Action       12/05/24  1310 100 mcg Given                   No specimens collected      See detailed result report with images in PACS.    The patient tolerated the procedure well without incident or complication and is in stable condition.

## 2024-12-05 NOTE — PRE-PROCEDURE NOTE
Interventional Radiology Preprocedure Note    Indication for procedure: The encounter diagnosis was Inguinal lymphocyst.    Relevant review of systems: NA    Relevant Labs:   Lab Results   Component Value Date    CREATININE 0.86 11/27/2024    EGFR 79 11/27/2024       Planned Sedation/Anesthesia: Moderate    Airway assessment: normal    Directed physical examination:    Patient is alert and oriented x3      Cardiovascular: Regular rhythm and rate    Lungs: Clear to auscultation      Abdomen: Soft, nontender      system: None relevant      Extremities: No evidence of pitting edema.       Mallampati: II (hard and soft palate, upper portion of tonsils and uvula visible)    ASA Score: ASA 3 - Patient with moderate systemic disease with functional limitations    Benefits, risks and alternatives of procedure and planned sedation have been discussed with the patient and/or their representative. All questions answered and they agree to proceed.

## 2024-12-05 NOTE — DISCHARGE INSTRUCTIONS
You received moderate sedation:  - Do not drive a car, or operate any machinery or power tools of any kind.  - Do not drink any alcoholic drinks.  - Do not take any over the counter medications that may cause drowsiness.  - Do not make any important decisions or sign any legal documents.  - You need to have a responsible adult accompany you home.  - You may resume your normal diet.  - We strongly suggest that a responsible adult be with you for the rest of the day and also during the night. This is for your protection and safety.     For questions related to your procedure:  Please call [phone] between the hours of 7:00am-5:00pm Monday through Friday.  Please call [phone] after 5:00pm and on weekends and holidays.     In the event of an emergency call 911 or go to your nearest emergency room.

## 2024-12-05 NOTE — Clinical Note
Sclero performed, injection with Doxycycline by Teresita Vivar MD. Patient received 100mcg fentanyl IVP. Doxy to dwell for 30 min. VSS throughout procedure.

## 2024-12-11 NOTE — PROGRESS NOTES
Patient ID: Nia Ontiveros is a 59 y.o. female.  Referring Physician: No referring provider defined for this encounter.  Primary Care Provider: No Assigned PCP Generic Provider, MD    Subjective    HPI: Nia Ontiveros is a 58yF presenting for follow-up of HEAVEN III. She reports severe vulvar pruritis and pain since June that she describes as a burning, stabbing 10/10 pain that worsens when sitting. She has tried lidocaine and steroid cream that have slightly relieved the itching and pain. She previously took 600 mg advil daily that she stopped due to GI upset. She endorses other symptoms such as cough with phlegm, right rib pain, abdominal bloating, and LLE edema.     9/4/24 WLE vulva  11/8/24 [place] inguinofemoral lymph node mapping and biopsy, WLE of vulva  11/27/24: Drain placed for lymphcyst    Interval History:  She states the drain helped significantly for 1 week and her pain level was 6/10 then this increased during her procedure done on 12/5/24 during aspiration and instillation of the sclerosing agent. She notes her BP was 205/95 for 1 hour. Confirms she doesn't have a follow up appointment scheduled with radiology. The patient is upset regarding her treatment and documentation regarding her procedure that was done through interventional radiology on 12/5/24. Reporting she has been experiencing a poor appetite secondary to lymphoedema onset 6 days p/o. The lymphadema involves the legs, groin and lower abdomen. She is going to the bathroom normally and eating and drinking normally. Otherwise she states she went to the ER after she was unable to get scheduled here because she was concerned about possible her groin area. She expressed she hasn't felt good for many weeks and is very concerned and anxious about her prognosis.    They deny fever, chills, constipation, diarrhea, vaginal bleeding, abdominal pain, nausea, vomiting, or any other symptoms other than those listed in the interval history.    PMH:  -  "Asthma controlled on albuterol inhalers PRN  - Vertigo   - Vulvar cancer    PSH:  - Breast biopsy, benign mass  - WLE  - [place] IFLND, WLE    OBHx:  The patient is a . Menarche was at 18. She underwent menopause at 36. She did not use HRT.    Social:  She endorses drinking 4-5 beers daily, smoking history of 1-1.5 packs for 30 years. No recreational drug use. The patient lives at home alone with her 2 dogs. 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     FamHx:  Great great grandmother with \"female\" cancer, unsure of the type of cancer. Grandfather with lung cancer. Other family history of T1D, dementia.     Their history is otherwise negative for a history of breast, ovarian, uterine, colon, pancreatic, and GI cancer.     Screening:  Cervical cancer: Pap smear 24 with ASCUS, positive HPV 16   Mammogram:  None  Colonoscopy: None      Review of Systems - Oncology     Objective   BSA: 1.56 meters squared  /88 (BP Location: Left arm, Patient Position: Sitting, BP Cuff Size: Adult)   Pulse 90   Temp 36.9 °C (98.4 °F) (Core)   Resp 18   Wt 53.2 kg (117 lb 4.6 oz)   SpO2 97%   BMI 19.52 kg/m²      No family history on file.    Nia Ontiveros  reports that she has been smoking cigarettes. She has been exposed to tobacco smoke. She has never used smokeless tobacco.  She  reports current alcohol use.  She  reports no history of drug use.    Physical Exam  Constitutional:       Appearance: Normal appearance. She is normal weight.   HENT:      Head: Normocephalic.      Mouth/Throat:      Mouth: Mucous membranes are moist.   Eyes:      Pupils: Pupils are equal, round, and reactive to light.   Cardiovascular:      Rate and Rhythm: Normal rate and regular rhythm.      Heart sounds: No murmur heard.     No friction rub. No gallop.   Pulmonary:      Effort: Pulmonary effort is normal.      Breath sounds: Normal breath sounds.   Abdominal:      General: Abdomen is flat. Bowel " sounds are normal.      Palpations: Abdomen is soft.   Genitourinary:         Comments: Slight hypopigmentation and small areas of hyperpigmentation of bilateral labia minora with extension to perianal region (region outline in red). Well healed WLE and biopsy sites. Bilateral groins with well healed incision. L groin with slightly firm structure (2.5cm in size). No erythema, drainage.   Musculoskeletal:         General: No swelling.   Skin:     General: Skin is warm and dry.   Neurological:      Mental Status: She is alert.           Surgical Pathology Exam: [id number]  Order: [id number]   Collected 9/4/2024 13:10       Status: Final result       Visible to patient: Yes (seen)       Dx: HEAVEN III (vulvar intraepithelial neopl...    0 Result Notes      Component    FINAL DIAGNOSIS   A. LEFT VAGINAL WALL BIOPSY:   -- SQUAMOUS EPITHELIUM; NO PATHOLOGIC DIAGNOSIS     B. CERVIX AT 3 O'CLOCK BIOPSY:   -- FIBROUS STROMA; NO SQUAMOUS EPITHELIUM IDENTIFIED     C. ENDOCERVIX CURETTINGS:   -- DETACHED SQUAMOUS EPITHELIUM WITH LOW-GRADE SQUAMOUS INTRAEPITHELIAL LESION (SVETA-1); SCANT UNREMARKABLE ENDOCERVICAL EPITHELIUM     D. VULVA, WIDE LOCAL EXCISION:   -- HIGH-GRADE SQUAMOUS INTRAEPITHELIAL LESION (HEAVEN-3, USUAL TYPE), INVOLVING THE PERIPHERAL MARGINS OF RESECTION FROM 10:00 TO 6:00     E.  VULVA WITH CLITORIS LESION AT 12 O'CLOCK, WIDE LOCAL EXCISION:    -- INVASIVE SQUAMOUS CELL CARCINOMA, MODERATELY DIFFERENTIATED, INVOLVING THE RIGHT LABIA MINORA AT 8 TO 12:00 (6 MM MAXIMUM DEPTH OF INVASION)  --EXTENSIVE HIGH-GRADE SQUAMOUS INTRAEPITHELIAL LESION (HEAVEN 3, USUAL AND WARTY TYPES) INVOLVING THE ENTIRE PERIPHERAL AND PERIURETHRAL MARGIN OF RESECTION  --PERIPHERAL, PERIURETHRAL, AND DEEP MARGINS OF RESECTION ARE NEGATIVE FOR INVASIVE CARCINOMA   Electronically signed by Jet Melton MD on 9/27/2024 at 0739        By the signature on this report, the individual or group listed as making the Final Interpretation/Diagnosis  certifies that they have reviewed this case.    Case Summary Report   VULVA   8th Edition - Protocol posted: 12/17/2021VULVA - All Specimens  SPECIMEN   Procedure  Wide excision   TUMOR   Tumor Focality  Unifocal   Tumor Site  Right vulva   Tumor Size  Greatest Dimension (Centimeters): 1.3 cm   Histologic Type  Squamous cell carcinoma, HPV-associated   Histologic Grade  G2, moderately differentiated   Depth of Tumor Invasion  6 mm   Tumor Border  Infiltrating   Other Tissue / Organ Involvement  Not applicable   Lymphovascular Invasion  Not identified   MARGINS   Margin Status for Invasive Carcinoma  All margins negative for invasive carcinoma   Closest Margin(s) to Invasive Carcinoma  Peripheral   Distance from Invasive Carcinoma to Closest Margin  6 mm mm   Margin Status for HSIL (VIN2-3) or dVIN  High-grade squamous intraepithelial lesion (HSIL) present at margin   Margin(s) Involved by HSIL  Peripheral: Entire margin of resection in specimen E, 10 to 6 o'clock in specimen D     REGIONAL LYMPH NODES   Regional Lymph Node Status  Not applicable (no regional lymph nodes submitted or found)   PATHOLOGIC STAGE CLASSIFICATION (pTNM, AJCC 8th ed.)   Reporting of pT, pN, and (when applicable) pM categories is based on information available to the pathologist at the time the report is issued. As per the AJCC (Chapter 1, 8th Ed.) it is the managing physician’s responsibility to establish the final pathologic stage based upon all pertinent information, including but potentially not limited to this pathology report.   pT Category  pT1b   pN Category  pN not assigned (cannot be determined based on available pathological information)   FIGO STAGE   FIGO Stage  IB   ADDITIONAL FINDINGS   Additional Findings  None identified           Surgical Pathology Exam: [id number]  Order: [id number]   Collected 11/8/2024 11:38       Status: Final result       Visible to patient: Yes (seen)       Dx: Vulvar cancer (Multi)    0 Result Notes        Component  Resulting Agency   FINAL DIAGNOSIS   A. SENTINEL LYMPH NODE, RIGHT INGUINOFEMORAL:   -- LYMPH NODES (2), NO EVIDENCE OF TUMOR, 0/2     B. SENTINEL LYMPH NODE, PELVIC, LEFT INGUINOFEMORAL:   --LYMPH NODES (2), NO EVIDENCE OF TUMOR, 0/2     C. VULVA WIDE LOCAL EXCISION, STITCH AT 12:   --PORTION OF SQUAMOUS MUCOSA WITH HIGH-GRADE SQUAMOUS INTRAEPITHELIAL LESION (HEAVEN 3, USUAL TYPE) INVOLVING THE PERIPHERAL MARGIN OF RESECTION FROM 12:00 TO 6:00     D. VULVA WIDE LOCAL EXCISION AND VAGINAL MARGIN, STITCH AT 6:   -- PORTION OF SQUAMOUS MUCOSA WITH HIGH-GRADE SQUAMOUS INTRAEPITHELIAL LESION (HEAVEN 3, WARTY TYPE) FOCALLY INVOLVING THE MARGIN OF RESECTION AT 3:00 AND EXTENDING TO LESS THAN 1 MM FROM THE MARGIN OF RESECTION AT AT 10:00 TO 2:00        SURGICAL PATHOLOGY  Order: [id number]  Component 8 d ago   Case Report Surgical Pathology Report                         Case: [id number]                                  Authorizing Provider:  Kanika Chan APRN.CNP  Collected:           12/04/2024 04:36 PM          Ordering Location:     Gy[facility]cology Oncology        Received:            12/05/2024 08:03 AM          Pathologist:           Jina Pike MD                                                      Specimens:   A) - Labia, Left, Biopsy, 3 oclock                                                                 B) - Labia, Right, Biopsy, 8 oclock                                                                 C) - Vagina, Biopsy, introitus 5 oclock                                                 FINAL DIAGNOSIS    A. Vulva, left labium at 3:00, biopsy:  -Vulvar skin/squamous mucosa with a superficial fungal infection; see comment.  -Squamous dysplasia is not identified.     B. Vulva, right labium at 8:00, biopsy:  -High-grade squamous intraepithelial lesion (HPV-associated HEAVEN-3).  -Invasive carcinoma is not identified.     C. Vagina, introitus at 5:00, biopsy:  -High-grade squamous intraepithelial  lesion (VaIN-3).   -Although superficial, invasive carcinoma is not identified in the submitted material.       US guided sclerosis of fluid collection  Narrative: Interpreted By:  Teresita Vivar and Baker Zachary   STUDY:  US GUIDED SCLEROSIS OF FLUID COLLECTION;  12/5/2024 2:52 pm      INDICATION:  Signs/Symptoms:right lymphocyst, drain lymphocyst.      COMPARISON:  Ultrasound guided drain placement on 11/27/2024. CT abdomen pelvis on  07/20/2024.      ACCESSION NUMBER(S):  [id number]      ORDERING CLINICIAN:  KEVIN AGUILERA      TECHNIQUE:  INTERVENTIONALIST(S):  Dr. Teresita Vivar.      CONSENT:  The patient/patient's POA/next of kin was informed of the nature of  the proposed procedure. The purposes, alternatives, risks, and  benefits were explained and discussed. All questions were answered  and consent was obtained.      SEDATION:  Moderate conscious IV sedation services (supervision of  administration, induction, and maintenance) were provided by the  physician performing the procedure with intravenous fentanyl 100mcg  for 20 minutes. The physician was assisted by an independent trained  observer, an interventional radiology nurse, in the continuous  monitoring of patient level of consciousness and physiologic status.      MEDICATION/CONTRAST:  7.5 mL of doxycycline mixed with sterile water (sclerosing agent).  Additionally 0.8 cm of Dilaudid was administered intravenously.      TIME OUT:  A time out was performed immediately prior to procedure start with  the interventional team, correctly identifying the patient name, date  of birth, MRN, procedure, anatomy (including marking of site and  side), patient position, procedure consent form, relevant laboratory  and imaging test results, antibiotic administration, safety  precautions, and procedure-specific equipment needs.      COMPLICATIONS:  No immediate adverse events identified.      FINDINGS:  The patient was placed in the supine position. Limited  sonographic  images of the left inguinal region were obtained, which demonstrated  an irregular predominantly hypoechoic subcutaneous fluid collection  with a drainage catheter tip visualized in place, consistent with  patient's known lymphocele. The area of concern was prepped under  sterile technique.      1% lidocaine was injected into the collection through the drainage  catheter. Subsequently approximately 7.5 mL of doxycycline mixed with  sterile water was injected into the collection through the drainage  catheter as a sclerosing agent. After approximately 30 minutes, a  small amount of the sclerosing agent was removed from the collection.  An additional small amount of lidocaine was then injected into this  collection due to patient's pain. Subsequently, the remainder of the  fluid (approximately 10 mL) was aspirated and the drain was removed  under ultrasound guidance.      The patient tolerated the procedure well and there were no immediate  complications.      Impression: Status post ultrasound guided injection of doxycycline mixed with  sterile water into a left inguinal lymphocele for sclerotherapy.  After approximately 1 hour, under ultrasound guidance the sclerosing  agent/fluid (approximately 10 mL in total) was aspirated and the  drainage catheter was removed.      I personally reviewed the images/study and I agree with the findings  as stated by Dr. David Andres M.D. This study was interpreted at  [place], [place].      MACRO:  None.          Signed by: Teresita Vivar 12/5/2024 4:21 PM  Dictation workstation:   [id number]       Oncology History Overview Note   9/4/24- WLE with at least stage Ib SCC of the vulva     Vulvar cancer (Multi)   10/3/2024 Initial Diagnosis    Vulvar cancer (Multi)           Assessment/Plan    59 y.o. 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 s/p WLE with negative margins for malignancy, but positive for HEAVEN in 9/2024, now s/p  subsequent WLE and sentinel IFLN bx with benign pathology in Lns and VIN3 on WLE complicated by lymphocyst in L groin    #Vulvar cancer  - We discussed her pathology  - We discussed the pathophysiology of vulvar cancer, its relationship to cancer and frequent association with HPV   - We reviewed pathology, WLE with HEAVEN 3 with positive margins   - Exam similar to prior in vulva, we reviewed her recent biopsy results from the [facility]. We also discussed her vulvar lesions do not appear consistent with classic HEAVEN, however despite this several biopsies/ WLE show HEAVEN.   - We discussed management options including topical imiquimod vs surgical resection. Her insurance is changing and she will be transitioning to the [facility]  - Plan for imiquimod prescription for now     # Lymphocyst  - S/p L drain and sclerosis  - Stat groin US as persistent L groin mass/cyst noted     # Lymphedema   - Referral to lymphedema clinic     # Post op  - Pathology previously reviewed between patient and Dr. Quiles  - Recovering well  - Reviewed ongoing restrictions (no lifting more than 10-15lb, no soaking)  - Unable to aspirate fluid from lymphocyst. Will need US guidance. Scheduled for follow up tomorrow. Continue warm compresses.    # Burning with urination  - S/p Abx, last culture with no growth  - Repeat Cx today given on going symptoms     # Cervical dysplasia  - We discussed the pathophysiology of cervical dyplasia, its relationship to cancer and frequent association with HPV   - Hx of HPV exposure, pap smear 7/2024 with ASCUS and HPV 16 positive  - Colposcopy/ bxs with SVETA 1  - Pap in 6 months    # ? Thyroiditis on PET, Elevated TSH  - Referral to endocrinology     MD Zoe Llanosibfanta Attestation  By signing my name below, I, Caro Pablo, Emeli   attest that this documentation has been prepared under the direction and in the presence of Dilcia Quiles MD.     Provider Attestation - Emeli  documentation    All medical record entries made by the Scribe were at my direction and personally dictated by me. I have reviewed the chart and agree that the record accurately reflects my personal performance of the history, physical exam, discussion and plan.    Dilcia Quiles MD

## 2024-12-12 ENCOUNTER — OFFICE VISIT (OUTPATIENT)
Dept: GYNECOLOGIC ONCOLOGY | Facility: CLINIC | Age: 59
End: 2024-12-12
Payer: MEDICARE

## 2024-12-12 ENCOUNTER — DOCUMENTATION (OUTPATIENT)
Dept: GYNECOLOGIC ONCOLOGY | Facility: HOSPITAL | Age: 59
End: 2024-12-12

## 2024-12-12 VITALS
HEART RATE: 90 BPM | RESPIRATION RATE: 18 BRPM | WEIGHT: 117.28 LBS | BODY MASS INDEX: 19.52 KG/M2 | TEMPERATURE: 98.4 F | SYSTOLIC BLOOD PRESSURE: 147 MMHG | OXYGEN SATURATION: 97 % | DIASTOLIC BLOOD PRESSURE: 88 MMHG

## 2024-12-12 DIAGNOSIS — R39.9 UTI SYMPTOMS: ICD-10-CM

## 2024-12-12 DIAGNOSIS — R19.09 GROIN MASS IN FEMALE: ICD-10-CM

## 2024-12-12 DIAGNOSIS — I89.0 ACQUIRED LYMPHEDEMA: ICD-10-CM

## 2024-12-12 DIAGNOSIS — R94.6 ABNORMAL THYROID FUNCTION TEST: ICD-10-CM

## 2024-12-12 DIAGNOSIS — I89.8 INGUINAL LYMPHOCYST: ICD-10-CM

## 2024-12-12 DIAGNOSIS — B37.31 VULVAR CANDIDIASIS: ICD-10-CM

## 2024-12-12 DIAGNOSIS — C51.9 VULVAR CANCER (MULTI): Primary | ICD-10-CM

## 2024-12-12 PROCEDURE — 99211 OFF/OP EST MAY X REQ PHY/QHP: CPT | Mod: 24 | Performed by: STUDENT IN AN ORGANIZED HEALTH CARE EDUCATION/TRAINING PROGRAM

## 2024-12-12 RX ORDER — FLUCONAZOLE 150 MG/1
150 TABLET ORAL DAILY
Qty: 5 TABLET | Refills: 0 | Status: SHIPPED | OUTPATIENT
Start: 2024-12-12 | End: 2024-12-17

## 2024-12-12 RX ORDER — IMIQUIMOD 12.5 MG/.25G
1 CREAM TOPICAL 3 TIMES WEEKLY
Qty: 12 PACKET | Refills: 11 | Status: SHIPPED | OUTPATIENT
Start: 2024-12-13 | End: 2025-12-13

## 2024-12-12 ASSESSMENT — PAIN SCALES - GENERAL: PAINLEVEL_OUTOF10: 0-NO PAIN

## 2024-12-12 NOTE — PROGRESS NOTES
Received cover my meds notification.  Entered required information into Cover my meds and sent to patient's insurance plan.  Await approval.

## 2024-12-13 ENCOUNTER — HOSPITAL ENCOUNTER (OUTPATIENT)
Dept: RADIOLOGY | Facility: CLINIC | Age: 59
Discharge: HOME | End: 2024-12-13
Payer: MEDICARE

## 2024-12-13 DIAGNOSIS — C51.9 VULVAR CANCER (MULTI): ICD-10-CM

## 2024-12-13 DIAGNOSIS — R19.09 GROIN MASS IN FEMALE: ICD-10-CM

## 2024-12-13 DIAGNOSIS — I89.8 INGUINAL LYMPHOCYST: ICD-10-CM

## 2024-12-13 PROCEDURE — 76881 US COMPL JOINT R-T W/IMG: CPT

## 2024-12-16 ENCOUNTER — HOSPITAL ENCOUNTER (OUTPATIENT)
Dept: VASCULAR MEDICINE | Facility: CLINIC | Age: 59
Discharge: HOME | End: 2024-12-16
Payer: MEDICARE

## 2024-12-16 ENCOUNTER — LAB (OUTPATIENT)
Dept: LAB | Facility: LAB | Age: 59
End: 2024-12-16
Payer: MEDICARE

## 2024-12-16 DIAGNOSIS — R19.09 GROIN MASS IN FEMALE: ICD-10-CM

## 2024-12-16 DIAGNOSIS — R39.9 UTI SYMPTOMS: ICD-10-CM

## 2024-12-16 DIAGNOSIS — I89.8 INGUINAL LYMPHOCYST: ICD-10-CM

## 2024-12-16 DIAGNOSIS — I77.1 STRICTURE OF ARTERY (CMS-HCC): ICD-10-CM

## 2024-12-16 LAB
APPEARANCE UR: CLEAR
BILIRUB UR STRIP.AUTO-MCNC: NEGATIVE MG/DL
COLOR UR: COLORLESS
ERYTHROCYTE [DISTWIDTH] IN BLOOD BY AUTOMATED COUNT: 12 % (ref 11.5–14.5)
GLUCOSE UR STRIP.AUTO-MCNC: NORMAL MG/DL
HCT VFR BLD AUTO: 46.1 % (ref 36–46)
HGB BLD-MCNC: 15.4 G/DL (ref 12–16)
INR PPP: 0.9 (ref 0.9–1.1)
KETONES UR STRIP.AUTO-MCNC: NEGATIVE MG/DL
LEUKOCYTE ESTERASE UR QL STRIP.AUTO: NEGATIVE
MCH RBC QN AUTO: 33.4 PG (ref 26–34)
MCHC RBC AUTO-ENTMCNC: 33.4 G/DL (ref 32–36)
MCV RBC AUTO: 100 FL (ref 80–100)
NITRITE UR QL STRIP.AUTO: NEGATIVE
NRBC BLD-RTO: 0 /100 WBCS (ref 0–0)
PH UR STRIP.AUTO: 5 [PH]
PLATELET # BLD AUTO: 275 X10*3/UL (ref 150–450)
PROT UR STRIP.AUTO-MCNC: NEGATIVE MG/DL
PROTHROMBIN TIME: 10 SECONDS (ref 9.8–12.8)
RBC # BLD AUTO: 4.61 X10*6/UL (ref 4–5.2)
RBC # UR STRIP.AUTO: NEGATIVE /UL
SP GR UR STRIP.AUTO: 1
UROBILINOGEN UR STRIP.AUTO-MCNC: NORMAL MG/DL
WBC # BLD AUTO: 8.3 X10*3/UL (ref 4.4–11.3)

## 2024-12-16 PROCEDURE — 85027 COMPLETE CBC AUTOMATED: CPT

## 2024-12-16 PROCEDURE — 81003 URINALYSIS AUTO W/O SCOPE: CPT

## 2024-12-16 PROCEDURE — 93976 VASCULAR STUDY: CPT

## 2024-12-16 PROCEDURE — 85610 PROTHROMBIN TIME: CPT

## 2024-12-16 PROCEDURE — 36415 COLL VENOUS BLD VENIPUNCTURE: CPT

## 2024-12-16 PROCEDURE — 87086 URINE CULTURE/COLONY COUNT: CPT

## 2024-12-16 PROCEDURE — 93976 VASCULAR STUDY: CPT | Performed by: SURGERY

## 2024-12-17 LAB — BACTERIA UR CULT: NORMAL

## (undated) DEVICE — NEEDLE, HYPODERMIC, REGULAR WALL, SHORT BEVEL, 22 G X 1.5 IN

## (undated) DEVICE — CATHETER TRAY, SURESTEP, 16FR, URINE METER W/STATLOCK

## (undated) DEVICE — Device

## (undated) DEVICE — SUTURE, VICRYL, 2-0, 27 IN, SH, UNDYED

## (undated) DEVICE — CAUTERY, PENCIL, PUSH BUTTON, SMOKE EVAC, 70MM

## (undated) DEVICE — MANIFOLD, 4 PORT NEPTUNE STANDARD

## (undated) DEVICE — SUTURE, VICRYL, 3-0, 27 IN, SH, VIOLET

## (undated) DEVICE — COVER, CART, 45 X 27 X 48 IN, CLEAR

## (undated) DEVICE — MARKER, SKIN, RULER AND LABEL PACK, CUSTOM

## (undated) DEVICE — SCOPE WARMER, LAPAROSCOPE, BAG ONLY, LF

## (undated) DEVICE — TOWEL, SURGICAL, NEURO, O/R, 16 X 26, BLUE, STERILE

## (undated) DEVICE — HOLSTER, ELECTROSURGERY ACCESSORY, STERILE

## (undated) DEVICE — SPONGE, LAP, XRAY DECT, 18IN X 18IN, W/LOOP, STERILE

## (undated) DEVICE — DRESSING, NON-ADHERENT, TELFA, OUCHLESS, 3 X 8 IN, STERILE

## (undated) DEVICE — COUNTER, NEEDLE, FOAM BLOCK, POP-N-COUNT, W/BLADEGUARD, W/ADHESIVE 40 COUNT, RED

## (undated) DEVICE — LIGASURE, CURVED, SMALL JAW

## (undated) DEVICE — GOWN, ASTOUND, L

## (undated) DEVICE — TRAY, MINOR, SINGLE BASIN, STERILE

## (undated) DEVICE — GLOVE, SURGICAL, PROTEXIS PI , 6.0, PF, LF

## (undated) DEVICE — SUTURE, PROLENE, 3-0, 18 IN, PS2, BLUE

## (undated) DEVICE — PAD, GROUNDING, ELECTROSURGICAL, W/9 FT CABLE, POLYHESIVE II, ADULT, LF

## (undated) DEVICE — SYRINGE, HYPODERMIC, CONTROL, LUER LOCK, 10 CC, PLASTIC, STERILE

## (undated) DEVICE — NEEDLE, HYPODERMIC, MONOJECT, 22 G X 1.5 IN, BLUE

## (undated) DEVICE — SPONGE GAUZE, XRAY SC+RFID, 4X4 16 PLY, STERILE

## (undated) DEVICE — SPONGE, HEMOSTATIC, CELLULOSE, SURGICEL, FIBRILLAR, 2 X 4 IN

## (undated) DEVICE — PAD, SANITARY, OBSTETRICAL, W/ADHSV STRIP,11 IN,LF

## (undated) DEVICE — PANTY, MESH, X-LARGE, GREEN

## (undated) DEVICE — SYRINGE, 20 CC, LUER LOCK

## (undated) DEVICE — REST, HEAD, BAGEL, 9 IN

## (undated) DEVICE — PREP TRAY, SKIN, DRY, W/GLOVES

## (undated) DEVICE — CATHETER, URETHRAL, ROBNEL, 16 FR, 16 IN, LF, RED